# Patient Record
Sex: MALE | Race: BLACK OR AFRICAN AMERICAN | NOT HISPANIC OR LATINO | Employment: FULL TIME | URBAN - METROPOLITAN AREA
[De-identification: names, ages, dates, MRNs, and addresses within clinical notes are randomized per-mention and may not be internally consistent; named-entity substitution may affect disease eponyms.]

---

## 2018-01-17 NOTE — MISCELLANEOUS
Message  Return to work or school:   Kendall Vega is under my professional care  He was seen in my office on 4/12/2016   He is able to return to work on  4/18/2016       Dr April Arrington        Signatures   Electronically signed by : STEPHANIE Heller ; Apr 18 2016 11:10AM EST

## 2018-11-28 ENCOUNTER — OFFICE VISIT (OUTPATIENT)
Dept: INTERNAL MEDICINE CLINIC | Facility: CLINIC | Age: 38
End: 2018-11-28
Payer: COMMERCIAL

## 2018-11-28 VITALS
TEMPERATURE: 97.8 F | BODY MASS INDEX: 35.14 KG/M2 | HEIGHT: 75 IN | HEART RATE: 76 BPM | SYSTOLIC BLOOD PRESSURE: 144 MMHG | WEIGHT: 282.63 LBS | DIASTOLIC BLOOD PRESSURE: 100 MMHG

## 2018-11-28 DIAGNOSIS — G47.33 OBSTRUCTIVE SLEEP APNEA: ICD-10-CM

## 2018-11-28 DIAGNOSIS — Z86.39 HISTORY OF HYPERLIPIDEMIA: ICD-10-CM

## 2018-11-28 DIAGNOSIS — Z00.00 ROUTINE HEALTH MAINTENANCE: ICD-10-CM

## 2018-11-28 DIAGNOSIS — I10 ESSENTIAL HYPERTENSION: Primary | ICD-10-CM

## 2018-11-28 DIAGNOSIS — R42 LIGHTHEADEDNESS: ICD-10-CM

## 2018-11-28 PROBLEM — I15.9 SECONDARY HYPERTENSION: Status: ACTIVE | Noted: 2018-11-28

## 2018-11-28 PROCEDURE — 1036F TOBACCO NON-USER: CPT | Performed by: INTERNAL MEDICINE

## 2018-11-28 PROCEDURE — 3008F BODY MASS INDEX DOCD: CPT | Performed by: INTERNAL MEDICINE

## 2018-11-28 PROCEDURE — 99204 OFFICE O/P NEW MOD 45 MIN: CPT | Performed by: INTERNAL MEDICINE

## 2018-11-28 RX ORDER — AMLODIPINE BESYLATE 5 MG/1
1 TABLET ORAL DAILY
COMMUNITY
Start: 2016-06-13 | End: 2018-11-28 | Stop reason: CLARIF

## 2018-11-28 RX ORDER — OXYCODONE HYDROCHLORIDE AND ACETAMINOPHEN 5; 325 MG/1; MG/1
TABLET ORAL
COMMUNITY
Start: 2015-12-18 | End: 2018-11-28 | Stop reason: CLARIF

## 2018-11-28 NOTE — PROGRESS NOTES
ASSESSMENT/PLAN:  Problem List Items Addressed This Visit     Essential hypertension - Primary     · On recheck of patient's blood pressure it was noted to be 134/78  · Will hold off on medications at this time  · Stressed the importance of wearing CPAP machine at night  · Discussed with patient regarding herbal medications and he agreed to stop taking them  · Patient to check blood pressures as outpatient and to call if elevated pressures noted         History of hyperlipidemia     · Will recheck lipid panel         Obstructive sleep apnea     · Continue to wear CPAP machine every night  · Patient states he has been feeling better since wearing his CPAP machine the past couple days and is no longer having headaches in the morning  Lightheadedness     · Discuss keeping up with fluid hydration throughout the day and slowly changing from sitting to standing position         Routine health maintenance     · Patient states he previously had HIV testing done which came back negative         Relevant Orders    Lipid panel    CBC and differential    Comprehensive metabolic panel    HEMOGLOBIN A1C W/ EAG ESTIMATION          Health Maintenance:  Patient denied immunizations      CHIEF COMPLAINT:   Lightheadedness    HISTORY OF PRESENT ILLNESS:  23-year-old male presents to the outpatient clinic dizziness  Past medical history sick in for hypertension, hyperlipidemia, and obstructive sleep apnea  Patient states that he was prescribed blood pressure medications in the past but had not taken because he "did not like medications"  He states he has been taking herbal medications including leave of life, Lisa Sagar vera, and garlic" about once a month  Recently he has been complaining of lightheadedness that comes on when he is working specifically in enclosed areas  He has never had any associated syncope, chest pain, or shortness of breath  Patient was also complaining of headaches that occur in the morning  Patient states that since he has been using his CPAP machine, which he had not used prior, his headaches have improved  Patient has been using CPAP machine for last couple days  Patient denies any tobacco, alcohol, or drug use  Patient is currently working as an  for [employer]   Constitutional: Negative for chills, fatigue, fever and unexpected weight change  HENT: Negative for congestion  Eyes: Negative for visual disturbance  Respiratory: Negative for cough, chest tightness, shortness of breath and wheezing  Cardiovascular: Negative for chest pain, palpitations and leg swelling  Gastrointestinal: Negative for abdominal distention, abdominal pain, constipation, diarrhea, nausea and vomiting  Genitourinary: Negative for dysuria  Neurological: Positive for headaches  Negative for dizziness, syncope, weakness and numbness  OBJECTIVE:  Vitals:    11/28/18 1347   BP: 144/100   Pulse: 76   Temp: 97 8 °F (36 6 °C)   Weight: 128 kg (282 lb 10 1 oz)   Height: 6' 3" (1 905 m)     Physical Exam   Constitutional: He is oriented to person, place, and time  He appears well-developed and well-nourished  No distress  HENT:   Head: Normocephalic and atraumatic  Eyes: Pupils are equal, round, and reactive to light  Conjunctivae are normal  No scleral icterus  Neck: Normal range of motion  Neck supple  Cardiovascular: Normal rate, regular rhythm, normal heart sounds and intact distal pulses  Exam reveals no gallop and no friction rub  No murmur heard  Pulmonary/Chest: Effort normal and breath sounds normal  No respiratory distress  He has no wheezes  He has no rales  Abdominal: Soft  Bowel sounds are normal  He exhibits no distension  There is no tenderness  Musculoskeletal: Normal range of motion  He exhibits no edema  5/5 strength in all extremities   Neurological: He is alert and oriented to person, place, and time  No cranial nerve deficit     Skin: Skin is warm  No rash noted  Nursing note and vitals reviewed  No current outpatient prescriptions on file  History reviewed  No pertinent past medical history  History reviewed  No pertinent surgical history  Social History     Social History    Marital status: /Civil Union     Spouse name: N/A    Number of children: N/A    Years of education: N/A     Occupational History    Not on file  Social History Main Topics    Smoking status: Former Smoker    Smokeless tobacco: Never Used    Alcohol use Yes      Comment: Social    Drug use: No    Sexual activity: Not on file     Other Topics Concern    Not on file     Social History Narrative    Caffeine use    Always uses seat belt          Family History   Problem Relation Age of Onset    Diabetes Mother     Hypertension Mother     Stroke Mother     Hyperlipidemia Sister        Cesar Haja Prit[facility] Internal [facility] PGY-1  [address] [facility]  [address] [facility]  [address] [facility],[address]  [place], [address] [facility]

## 2018-11-28 NOTE — ASSESSMENT & PLAN NOTE
· Continue to wear CPAP machine every night  · Patient states he has been feeling better since wearing his CPAP machine the past couple days and is no longer having headaches in the morning 
· Discuss keeping up with fluid hydration throughout the day and slowly changing from sitting to standing position
· On recheck of patient's blood pressure it was noted to be 134/78  · Will hold off on medications at this time  · Stressed the importance of wearing CPAP machine at night  · Discussed with patient regarding herbal medications and he agreed to stop taking them  · Patient to check blood pressures as outpatient and to call if elevated pressures noted
· Patient states he previously had HIV testing done which came back negative
· Will recheck lipid panel
Breast tumor  right  History of biopsy  right axilla  History of cholecystectomy    History of hip replacement, total, left    History of hysterectomy  partial  Surgery, elective  hemorrhoids

## 2019-01-27 ENCOUNTER — APPOINTMENT (EMERGENCY)
Dept: RADIOLOGY | Facility: HOSPITAL | Age: 39
End: 2019-01-27
Payer: COMMERCIAL

## 2019-01-27 ENCOUNTER — HOSPITAL ENCOUNTER (EMERGENCY)
Facility: HOSPITAL | Age: 39
Discharge: HOME | End: 2019-01-28
Attending: EMERGENCY MEDICINE | Admitting: EMERGENCY MEDICINE
Payer: COMMERCIAL

## 2019-01-27 DIAGNOSIS — J18.9 PNEUMONIA: Primary | ICD-10-CM

## 2019-01-27 LAB
ALBUMIN SERPL BCP-MCNC: 3.9 G/DL (ref 3.5–5)
ALP SERPL-CCNC: 74 U/L (ref 46–116)
ALT SERPL W P-5'-P-CCNC: 33 U/L (ref 12–78)
ANION GAP SERPL CALCULATED.3IONS-SCNC: 7 MMOL/L (ref 4–13)
AST SERPL W P-5'-P-CCNC: 19 U/L (ref 5–45)
BACTERIA UR QL AUTO: ABNORMAL /HPF
BASOPHILS # BLD AUTO: 0.01 THOUSANDS/ΜL (ref 0–0.1)
BASOPHILS NFR BLD AUTO: 0 % (ref 0–1)
BILIRUB SERPL-MCNC: 0.41 MG/DL (ref 0.2–1)
BILIRUB UR QL STRIP: ABNORMAL
BUN SERPL-MCNC: 18 MG/DL (ref 5–25)
CALCIUM SERPL-MCNC: 8.4 MG/DL (ref 8.3–10.1)
CHLORIDE SERPL-SCNC: 102 MMOL/L (ref 100–108)
CLARITY UR: CLEAR
CLARITY, POC: CLEAR
CO2 SERPL-SCNC: 27 MMOL/L (ref 21–32)
COLOR UR: YELLOW
COLOR, POC: YELLOW
CREAT SERPL-MCNC: 1.56 MG/DL (ref 0.6–1.3)
EOSINOPHIL # BLD AUTO: 0.01 THOUSAND/ΜL (ref 0–0.61)
EOSINOPHIL NFR BLD AUTO: 0 % (ref 0–6)
ERYTHROCYTE [DISTWIDTH] IN BLOOD BY AUTOMATED COUNT: 14.3 % (ref 11.6–15.1)
GFR SERPL CREATININE-BSD FRML MDRD: 63 ML/MIN/1.73SQ M
GLUCOSE SERPL-MCNC: 114 MG/DL (ref 65–140)
GLUCOSE UR STRIP-MCNC: NEGATIVE MG/DL
HCT VFR BLD AUTO: 42 % (ref 36.5–49.3)
HGB BLD-MCNC: 14.4 G/DL (ref 12–17)
HGB UR QL STRIP.AUTO: ABNORMAL
HYALINE CASTS #/AREA URNS LPF: ABNORMAL /LPF
IMM GRANULOCYTES # BLD AUTO: 0.01 THOUSAND/UL (ref 0–0.2)
IMM GRANULOCYTES NFR BLD AUTO: 0 % (ref 0–2)
KETONES UR STRIP-MCNC: ABNORMAL MG/DL
LEUKOCYTE ESTERASE UR QL STRIP: NEGATIVE
LYMPHOCYTES # BLD AUTO: 0.83 THOUSANDS/ΜL (ref 0.6–4.47)
LYMPHOCYTES NFR BLD AUTO: 18 % (ref 14–44)
MCH RBC QN AUTO: 25.8 PG (ref 26.8–34.3)
MCHC RBC AUTO-ENTMCNC: 34.3 G/DL (ref 31.4–37.4)
MCV RBC AUTO: 75 FL (ref 82–98)
MONOCYTES # BLD AUTO: 0.39 THOUSAND/ΜL (ref 0.17–1.22)
MONOCYTES NFR BLD AUTO: 9 % (ref 4–12)
NEUTROPHILS # BLD AUTO: 3.3 THOUSANDS/ΜL (ref 1.85–7.62)
NEUTS SEG NFR BLD AUTO: 73 % (ref 43–75)
NITRITE UR QL STRIP: NEGATIVE
NON-SQ EPI CELLS URNS QL MICRO: ABNORMAL /HPF
NRBC BLD AUTO-RTO: 0 /100 WBCS
PH UR STRIP.AUTO: 5.5 [PH] (ref 4.5–8)
PLATELET # BLD AUTO: 229 THOUSANDS/UL (ref 149–390)
PMV BLD AUTO: 11.6 FL (ref 8.9–12.7)
POTASSIUM SERPL-SCNC: 3.4 MMOL/L (ref 3.5–5.3)
PROT SERPL-MCNC: 8.3 G/DL (ref 6.4–8.2)
PROT UR STRIP-MCNC: ABNORMAL MG/DL
RBC # BLD AUTO: 5.58 MILLION/UL (ref 3.88–5.62)
RBC #/AREA URNS AUTO: ABNORMAL /HPF
SODIUM SERPL-SCNC: 136 MMOL/L (ref 136–145)
SP GR UR STRIP.AUTO: 1.02 (ref 1–1.03)
TROPONIN I SERPL-MCNC: <0.02 NG/ML
UROBILINOGEN UR QL STRIP.AUTO: 1 E.U./DL
WBC # BLD AUTO: 4.55 THOUSAND/UL (ref 4.31–10.16)
WBC #/AREA URNS AUTO: ABNORMAL /HPF

## 2019-01-27 PROCEDURE — 80053 COMPREHEN METABOLIC PANEL: CPT | Performed by: EMERGENCY MEDICINE

## 2019-01-27 PROCEDURE — 96361 HYDRATE IV INFUSION ADD-ON: CPT

## 2019-01-27 PROCEDURE — 36415 COLL VENOUS BLD VENIPUNCTURE: CPT | Performed by: EMERGENCY MEDICINE

## 2019-01-27 PROCEDURE — 96374 THER/PROPH/DIAG INJ IV PUSH: CPT

## 2019-01-27 PROCEDURE — 93005 ELECTROCARDIOGRAM TRACING: CPT

## 2019-01-27 PROCEDURE — 71046 X-RAY EXAM CHEST 2 VIEWS: CPT

## 2019-01-27 PROCEDURE — 85025 COMPLETE CBC W/AUTO DIFF WBC: CPT | Performed by: EMERGENCY MEDICINE

## 2019-01-27 PROCEDURE — 94640 AIRWAY INHALATION TREATMENT: CPT

## 2019-01-27 PROCEDURE — 84484 ASSAY OF TROPONIN QUANT: CPT | Performed by: EMERGENCY MEDICINE

## 2019-01-27 PROCEDURE — 99284 EMERGENCY DEPT VISIT MOD MDM: CPT

## 2019-01-27 PROCEDURE — 81001 URINALYSIS AUTO W/SCOPE: CPT

## 2019-01-27 RX ORDER — ACETAMINOPHEN 325 MG/1
975 TABLET ORAL ONCE
Status: COMPLETED | OUTPATIENT
Start: 2019-01-27 | End: 2019-01-27

## 2019-01-27 RX ORDER — KETOROLAC TROMETHAMINE 30 MG/ML
15 INJECTION, SOLUTION INTRAMUSCULAR; INTRAVENOUS ONCE
Status: COMPLETED | OUTPATIENT
Start: 2019-01-27 | End: 2019-01-27

## 2019-01-27 RX ADMIN — KETOROLAC TROMETHAMINE 15 MG: 30 INJECTION, SOLUTION INTRAMUSCULAR at 23:16

## 2019-01-27 RX ADMIN — ACETAMINOPHEN 975 MG: 325 TABLET, FILM COATED ORAL at 23:16

## 2019-01-27 RX ADMIN — ALBUTEROL SULFATE 5 MG: 2.5 SOLUTION RESPIRATORY (INHALATION) at 23:16

## 2019-01-27 RX ADMIN — SODIUM CHLORIDE 2000 ML: 0.9 INJECTION, SOLUTION INTRAVENOUS at 23:11

## 2019-01-27 RX ADMIN — IPRATROPIUM BROMIDE 0.5 MG: 0.5 SOLUTION RESPIRATORY (INHALATION) at 23:16

## 2019-01-28 VITALS
OXYGEN SATURATION: 97 % | HEART RATE: 101 BPM | SYSTOLIC BLOOD PRESSURE: 128 MMHG | DIASTOLIC BLOOD PRESSURE: 60 MMHG | TEMPERATURE: 100.5 F | RESPIRATION RATE: 18 BRPM | BODY MASS INDEX: 33.85 KG/M2 | WEIGHT: 278 LBS | HEIGHT: 76 IN

## 2019-01-28 LAB
ATRIAL RATE: 113 BPM
P AXIS: 66 DEGREES
PR INTERVAL: 132 MS
QRS AXIS: 92 DEGREES
QRSD INTERVAL: 84 MS
QT INTERVAL: 296 MS
QTC INTERVAL: 406 MS
T WAVE AXIS: 36 DEGREES
VENTRICULAR RATE: 113 BPM

## 2019-01-28 PROCEDURE — 96361 HYDRATE IV INFUSION ADD-ON: CPT

## 2019-01-28 PROCEDURE — 93010 ELECTROCARDIOGRAM REPORT: CPT | Performed by: INTERNAL MEDICINE

## 2019-01-28 RX ORDER — DOXYCYCLINE HYCLATE 100 MG/1
200 CAPSULE ORAL 2 TIMES DAILY
Qty: 16 CAPSULE | Refills: 0 | Status: SHIPPED | OUTPATIENT
Start: 2019-01-28 | End: 2019-02-02

## 2019-01-28 RX ORDER — DOXYCYCLINE HYCLATE 100 MG/1
200 CAPSULE ORAL ONCE
Status: COMPLETED | OUTPATIENT
Start: 2019-01-28 | End: 2019-01-28

## 2019-01-28 RX ORDER — PROMETHAZINE HYDROCHLORIDE AND CODEINE PHOSPHATE 6.25; 1 MG/5ML; MG/5ML
5 SYRUP ORAL EVERY 6 HOURS PRN
Qty: 118 ML | Refills: 0 | Status: SHIPPED | OUTPATIENT
Start: 2019-01-28 | End: 2019-01-31

## 2019-01-28 RX ADMIN — DOXYCYCLINE 200 MG: 100 CAPSULE ORAL at 00:54

## 2019-01-28 RX ADMIN — DOXYCYCLINE 200 MG: 100 CAPSULE ORAL at 01:09

## 2019-01-28 NOTE — ED PROVIDER NOTES
History  Chief Complaint   Patient presents with    Generalized Body Aches     Pt reports feeling generally weak since saturday with loss in appetite and cough/congestion     HPI       24-year-old with unremarkable past medical history presenting with chief complaint of feeling weak  It started on Saturday  Patient admits to hacking cough, patient admits the  Constant rhinorrhea  Patient admits to dripping in the back of the throat  Worse at night  Patient states that he has body aches and fatigue  Did not receive flu vaccine  Patient is here in the emergency department because he feels so fatigue  Patient denies fever chills rigors  Patient admits to diffuse myalgias no sick contacts at home  Patient has been able to tolerate p o  Without difficulty but patient states that he has not wanted to eat as much  Cough has become more yellow in nature  Patient denies fever chills rigors chest pain palpitations hemoptysis pleurisy abdominal pain nausea vomiting diarrhea constipation urinary symptoms motor weakness numbness and tingling  None       No past medical history on file  No past surgical history on file  Family History   Problem Relation Age of Onset    Diabetes Mother     Hypertension Mother     Stroke Mother     Hyperlipidemia Sister      I have reviewed and agree with the history as documented  Social History   Substance Use Topics    Smoking status: Former Smoker    Smokeless tobacco: Never Used    Alcohol use Yes      Comment: Social        Review of Systems   Constitutional: Positive for fatigue  Respiratory: Positive for cough  All other systems reviewed and are negative        Physical Exam  ED Triage Vitals [01/27/19 2108]   Temperature Pulse Respirations Blood Pressure SpO2   100 5 °F (38 1 °C) (!) 123 18 142/78 96 %      Temp Source Heart Rate Source Patient Position - Orthostatic VS BP Location FiO2 (%)   Oral Monitor Lying Left arm --      Pain Score       7 Orthostatic Vital Signs  Vitals:    01/27/19 2108 01/27/19 2239 01/27/19 2300 01/28/19 0103   BP: 142/78 164/83 149/86 128/60   Pulse: (!) 123 (!) 121 (!) 110 101   Patient Position - Orthostatic VS: Lying Lying  Lying       Physical Exam   Constitutional: He is oriented to person, place, and time  He appears well-developed and well-nourished  No distress  HENT:   Head: Normocephalic and atraumatic  Right Ear: External ear normal    Left Ear: External ear normal    Nose: Mucosal edema and rhinorrhea present  Mouth/Throat: Oropharynx is clear and moist  No oropharyngeal exudate  Eyes: Pupils are equal, round, and reactive to light  Conjunctivae and EOM are normal  Right eye exhibits no discharge  Left eye exhibits no discharge  No scleral icterus  Neck: Normal range of motion  Neck supple  No JVD present  No tracheal deviation present  No thyromegaly present  Cardiovascular: Normal rate, regular rhythm, normal heart sounds and intact distal pulses  No murmur heard  Pulmonary/Chest: Effort normal and breath sounds normal  No stridor  No respiratory distress  He has no wheezes  He has no rales  Abdominal: Soft  Bowel sounds are normal  He exhibits no distension and no mass  There is no tenderness  There is no rebound and no guarding  Musculoskeletal: Normal range of motion  He exhibits no edema, tenderness or deformity  Lymphadenopathy:     He has no cervical adenopathy  Neurological: He is alert and oriented to person, place, and time  No cranial nerve deficit  He exhibits normal muscle tone  Coordination normal    Skin: Skin is warm and dry  No rash noted  He is not diaphoretic  No erythema  Psychiatric: He has a normal mood and affect  His behavior is normal  Judgment and thought content normal    Nursing note and vitals reviewed        ED Medications  Medications   sodium chloride 0 9 % bolus 2,000 mL (0 mL Intravenous Stopped 1/28/19 0253)   acetaminophen (TYLENOL) tablet 975 mg (975 mg Oral Given 1/27/19 2316)   ketorolac (TORADOL) injection 15 mg (15 mg Intravenous Given 1/27/19 2316)   albuterol inhalation solution 5 mg (5 mg Nebulization Given 1/27/19 2316)   ipratropium (ATROVENT) 0 02 % inhalation solution 0 5 mg (0 5 mg Nebulization Given 1/27/19 2316)   doxycycline hyclate (VIBRAMYCIN) capsule 200 mg (200 mg Oral Given 1/28/19 0054)   doxycycline hyclate (VIBRAMYCIN) capsule 200 mg (200 mg Oral Given 1/28/19 0109)       Diagnostic Studies  Results Reviewed     Procedure Component Value Units Date/Time    Troponin I [[id number]]  (Normal) Collected:  01/27/19 2309    Lab Status:  Final result Specimen:  Blood from Arm, Left Updated:  01/27/19 2337     Troponin I <0 02 ng/mL     Comprehensive metabolic panel [[id number]]  (Abnormal) Collected:  01/27/19 2310    Lab Status:  Final result Specimen:  Blood from Arm, Left Updated:  01/27/19 2335     Sodium 136 mmol/L      Potassium 3 4 (L) mmol/L      Chloride 102 mmol/L      CO2 27 mmol/L      ANION GAP 7 mmol/L      BUN 18 mg/dL      Creatinine 1 56 (H) mg/dL      Glucose 114 mg/dL      Calcium 8 4 mg/dL      AST 19 U/L      ALT 33 U/L      Alkaline Phosphatase 74 U/L      Total Protein 8 3 (H) g/dL      Albumin 3 9 g/dL      Total Bilirubin 0 41 mg/dL      eGFR 63 ml/min/1 73sq m     Narrative:         National Kidney Disease Education Program recommendations are as follows:  GFR calculation is accurate only with a steady state creatinine  Chronic Kidney disease less than 60 ml/min/1 73 sq  meters  Kidney failure less than 15 ml/min/1 73 sq  meters      CBC and differential [[id number]]  (Abnormal) Collected:  01/27/19 2309    Lab Status:  Final result Specimen:  Blood from Arm, Left Updated:  01/27/19 2320     WBC 4 55 Thousand/uL      RBC 5 58 Million/uL      Hemoglobin 14 4 g/dL      Hematocrit 42 0 %      MCV 75 (L) fL      MCH 25 8 (L) pg      MCHC 34 3 g/dL      RDW 14 3 %      MPV 11 6 fL      Platelets 807 Thousands/uL      nRBC 0 /100 WBCs      Neutrophils Relative 73 %      Immat GRANS % 0 %      Lymphocytes Relative 18 %      Monocytes Relative 9 %      Eosinophils Relative 0 %      Basophils Relative 0 %      Neutrophils Absolute 3 30 Thousands/µL      Immature Grans Absolute 0 01 Thousand/uL      Lymphocytes Absolute 0 83 Thousands/µL      Monocytes Absolute 0 39 Thousand/µL      Eosinophils Absolute 0 01 Thousand/µL      Basophils Absolute 0 01 Thousands/µL     Urine Microscopic [[id number]]  (Abnormal) Collected:  01/27/19 2207    Lab Status:  Final result Specimen:  Urine from Urine, Other Updated:  01/27/19 2218     RBC, UA None Seen /hpf      WBC, UA 2-4 (A) /hpf      Epithelial Cells None Seen /hpf      Bacteria, UA None Seen /hpf      Hyaline Casts, UA None Seen /lpf     POCT urinalysis dipstick [[id number]]  (Normal) Resulted:  01/27/19 2206    Lab Status:  Final result Updated:  01/27/19 2206     Color, UA yellow     Clarity, UA clear    ED Urine Macroscopic [[id number]]  (Abnormal) Collected:  01/27/19 2207    Lab Status:  Final result Specimen:  Urine Updated:  01/27/19 2205     Color, UA Yellow     Clarity, UA Clear     pH, UA 5 5     Leukocytes, UA Negative     Nitrite, UA Negative     Protein, UA 30 (1+) (A) mg/dl      Glucose, UA Negative mg/dl      Ketones, UA Trace (A) mg/dl      Urobilinogen, UA 1 0 E U /dl      Bilirubin, UA Interference- unable to analyze (A)     Blood, UA Trace (A)     Specific [place], UA 1 025    Narrative:       CLINITEK RESULT                 XR chest 2 views   ED Interpretation by Stevenson Gilford, DO (01/27 1419)   Lingular infiltrate      Final Result by Rogers Gates MD (01/28 1010)      Increased mild bibasilar opacity compatible with atelectasis  No definite focal infiltrate              Workstation performed: [id number]               Procedures  ECG 12 Lead Documentation  Date/Time: 1/27/2019 10:49 PM  Performed by: Anila Longo  Authorized by: Anila Longo     Comments: Ventricular Rate    Atrial Rate    ME Interval ms 132   QRSD Interval ms 84   QT Interval ms 296   QTC Interval ms 406   P Axis degrees 66   QRS Axis degrees 92   T Wave Axis degrees 36   Narrative     Sinus tachycardia  Rightward axis  Borderline ECG  No previous ECGs available                Phone Consults  ED Phone Contact    ED Course                               MDM  Number of Diagnoses or Management Options  Pneumonia:   Diagnosis management comments: 20-year-old male presenting with cough  On exam vital signs show tachycardia  Patient appears to have infectious process with rhinorrhea  Differential diagnosis includes but not limited to pneumoni, bronchitis, pneumothorax, influenza  Lab work showed mild elevation in creatinine, patient given 2 L, heart rate trended down  Chest x-ray shows lingular infiltrate  Patient given doxycycline  Patient will follow up with PCP in the next 2 days  ED return precautions discussed  Patient agrees to follow-up care he demonstrates understanding  EKG showed tachycardia with no ischemia, troponin was negative doubt ACS at this time considered other diagnosis is such as pulmonary embolism, this appears to be infectious in nature  Pulmonary embolism clinically excluded  CritCare Time    Disposition  Final diagnoses:   Pneumonia     Time reflects when diagnosis was documented in both MDM as applicable and the Disposition within this note     Time User Action Codes Description Comment    1/28/2019  1:58 AM Aquilino Riddle Add [J18 [id number]] Pneumonia       ED Disposition     ED Disposition Condition Comment    Discharge  Liasha Pilblanca discharge to home/self care  Condition at discharge: Good    Return precautions were discussed with patient  Patient understands when to return to  Emergency department  Patient agrees to discharge plan and follow up care             Follow-up Information     Follow up With Specialties Details Why Contact Marylou Collins MD Family Medicine Go in 2 days  [address] [facility]  [phone]            Discharge Medication List as of 1/28/2019  2:07 AM      START taking these medications    Details   doxycycline hyclate (VIBRAMYCIN) 100 mg capsule Take 2 capsules (200 mg total) by mouth 2 (two) times a day for 5 days, Starting Mon 1/28/2019, Until Sat 2/2/2019, Print      promethazine-codeine (PHENERGAN WITH CODEINE) 6 25-10 mg/5 mL syrup Take 5 mL by mouth every 6 (six) hours as needed for cough for up to 3 days, Starting Mon 1/28/2019, Until Thu 1/31/2019, Print           No discharge procedures on file  ED Provider  Attending physically available and evaluated Urbano Valdovinos I managed the patient along with the ED Attending      Electronically Signed by         Robbin Arellano DO  01/28/19 4511

## 2019-01-28 NOTE — DISCHARGE INSTRUCTIONS
Community Acquired Pneumonia   WHAT YOU NEED TO KNOW:   Community-acquired pneumonia (CAP) is a lung infection that you get outside of a hospital or nursing home setting  Your lungs become inflamed and cannot work well  CAP may be caused by bacteria, viruses, or fungi  DISCHARGE INSTRUCTIONS:   Return to the emergency department if:   · You are confused and cannot think clearly  · You have increased trouble breathing  · Your lips or fingernails turn gray or blue  Contact your healthcare provider if:   · Your symptoms do not get better, or they get worse  · You are urinating less, or not at all  · You have questions or concerns about your condition or care  Medicines:   · Medicines  may be given to treat a bacterial, viral, or fungal infection  You may also be given medicines to dilate your bronchial tubes to help you breathe more easily  · Take your medicine as directed  Contact your healthcare provider if you think your medicine is not helping or if you have side effects  Tell him or her if you are allergic to any medicine  Keep a list of the medicines, vitamins, and herbs you take  Include the amounts, and when and why you take them  Bring the list or the pill bottles to follow-up visits  Carry your medicine list with you in case of an emergency  Follow up with your healthcare provider within 3 days or as directed: You may need another x-ray  Write down your questions so you remember to ask them during your visits  Deep breathing and coughing:  Deep breathing helps open the air passages in your lungs  Coughing helps bring up mucus from your lungs  Take a deep breath and hold the breath as long as you can  Then push the air out of your lungs with a deep, strong cough  Spit out any mucus you have coughed up  Take 10 deep breaths in a row every hour that you are awake  Remember to follow each deep breath with a cough     Do not smoke or allow others to smoke around you:  Nicotine and other chemicals in cigarettes and cigars can cause lung damage  Ask your healthcare provider for information if you currently smoke and need help to quit  E-cigarettes or smokeless tobacco still contain nicotine  Talk to your healthcare provider before you use these products  Manage CAP at home:   · Breathe warm, moist air  This helps loosen mucus  Loosely place a warm, wet washcloth over your nose and mouth  A room humidifier may also help make the air moist     · Drink liquids as directed  Ask your healthcare provider how much liquid to drink each day and which liquids to drink  Liquids help make mucus thin and easier to get out of your body  · Gently tap your chest   This helps loosen mucus so it is easier to cough  Lie with your head lower than your chest several times a day and tap your chest      · Get plenty of rest   Rest helps your body heal   Prevent CAP:   · Wash your hands often with soap and water  Carry germ-killing hand gel with you  You can use the gel to clean your hands when soap and water are not available  Do not touch your eyes, nose, or mouth unless you have washed your hands first      · Clean surfaces often  Clean doorknobs, countertops, cell phones, and other surfaces that are touched often  · Always cover your mouth when you cough  Cough into a tissue or your shirtsleeve so you do not spread germs from your hands  · Try to avoid people who have a cold or the flu  If you are sick, stay away from others as much as possible  · Ask about vaccines  You may need a vaccine to help prevent pneumonia  Get an influenza (flu) vaccine every year as soon as it becomes available  © 2017 [employer] [employer] Information is for End User's use only and may not be sold, redistributed or otherwise used for commercial purposes  All illustrations and images included in CareNotes® are the copyrighted property of [employer]  or [employer]    The above information is an  only  It is not intended as medical advice for individual conditions or treatments  Talk to your doctor, nurse or pharmacist before following any medical regimen to see if it is safe and effective for you

## 2019-10-10 ENCOUNTER — OFFICE VISIT (OUTPATIENT)
Dept: INTERNAL MEDICINE CLINIC | Facility: CLINIC | Age: 39
End: 2019-10-10

## 2019-10-10 VITALS
HEIGHT: 75 IN | WEIGHT: 286.16 LBS | DIASTOLIC BLOOD PRESSURE: 82 MMHG | BODY MASS INDEX: 35.58 KG/M2 | SYSTOLIC BLOOD PRESSURE: 120 MMHG | HEART RATE: 80 BPM | TEMPERATURE: 98.1 F

## 2019-10-10 DIAGNOSIS — Z86.39 HISTORY OF HYPERLIPIDEMIA: ICD-10-CM

## 2019-10-10 DIAGNOSIS — J30.9 ALLERGIC SINUSITIS: Primary | ICD-10-CM

## 2019-10-10 DIAGNOSIS — G47.33 OBSTRUCTIVE SLEEP APNEA: ICD-10-CM

## 2019-10-10 PROCEDURE — 99213 OFFICE O/P EST LOW 20 MIN: CPT | Performed by: INTERNAL MEDICINE

## 2019-10-10 PROCEDURE — 1036F TOBACCO NON-USER: CPT | Performed by: INTERNAL MEDICINE

## 2019-10-10 PROCEDURE — 3008F BODY MASS INDEX DOCD: CPT | Performed by: INTERNAL MEDICINE

## 2019-10-10 RX ORDER — AMILORIDE HCL 5 MG
10 TABLET ORAL EVERY 6 HOURS
Qty: 16 TABLET | Refills: 0 | Status: SHIPPED | OUTPATIENT
Start: 2019-10-10 | End: 2022-03-30

## 2019-10-10 RX ORDER — FLUTICASONE PROPIONATE 50 MCG
1 SPRAY, SUSPENSION (ML) NASAL DAILY
Qty: 1 BOTTLE | Refills: 0 | Status: SHIPPED | OUTPATIENT
Start: 2019-10-10 | End: 2022-03-30

## 2019-10-10 NOTE — PROGRESS NOTES
Assessment/Plan:    Allergic sinusitis  -     start fluticasone (FLONASE) 50 mcg/act nasal spray; 1 spray into each nostril daily  -     start phenylephrine (SUDAFED PE) 10 MG TABS; Take 1 tablet (10 mg total) by mouth every 6 (six) hours for 4 days  -     will check HIV    Obstructive sleep apnea        -     instructed on importance of using CPAP given headaches and hypertension with SAHIL    Patient offered influenza vaccine in clinic however refused  Subjective:      Patient ID: Kelly Clarke is a 37 y o  male  49-year-old male with past medical history significant for SAHIL noncompliant with CPAP, hypertension, hyperlipidemia, former smoker  Patient comes to clinic today for same-day appointment for frontal headache that he has had since August   Patient states headache is intermittent and only present when bending over  Patient states pain lasts few seconds  Patient also states he has occasional runny nose and sneezing  Patient denies any fevers, chills, night sweats, nausea, vomiting, photophobia, phonophobia, photopsia  Patient states he has obstructive sleep apnea but has not been using CPAP SCD does not find a comfortable  Patient instructed on the importance of using CPAP given headaches, hypertension  Patient's blood pressure 120/82 today in office  Patient states he is sexually active with 1 partner who is a female and does use protection  Patient states he was tested for HIV months ago however this is not seen in chart  Patient denies focal neurological deficit  The following portions of the patient's history were reviewed and updated as appropriate: allergies, current medications, past family history, past medical history, past social history, past surgical history and problem list     Review of Systems   Constitutional: Negative for appetite change, chills, diaphoresis, fatigue, fever and unexpected weight change  HENT: Negative for sore throat      Eyes: Negative for visual disturbance  Respiratory: Negative for cough, chest tightness, shortness of breath and wheezing  Cardiovascular: Negative for chest pain, palpitations and leg swelling  Gastrointestinal: Negative for abdominal distention, abdominal pain, blood in stool, constipation, diarrhea, nausea and vomiting  Genitourinary: Negative for difficulty urinating, flank pain and urgency  Musculoskeletal: Negative for arthralgias and myalgias  Skin: Negative for pallor and rash  Neurological: Positive for headaches  Negative for dizziness, weakness and light-headedness  Objective:      /82 (BP Location: Other (Comment), Patient Position: Sitting, Cuff Size: Standard) Comment (BP Location): Left forearm  Pulse 80   Temp 98 1 °F (36 7 °C) (Oral)   Ht 6' 3" (1 905 m)   Wt 130 kg (286 lb 2 5 oz)   BMI 35 77 kg/m²          Physical Exam   Constitutional: He is oriented to person, place, and time  He appears well-developed and well-nourished  No distress  HENT:   Head: Normocephalic and atraumatic  Right Ear: External ear normal    Left Ear: External ear normal    Nose: Nose normal    Mouth/Throat: Oropharynx is clear and moist  No oropharyngeal exudate  No tenderness to palpation of the sinuses   Eyes: Pupils are equal, round, and reactive to light  Conjunctivae and EOM are normal  No scleral icterus  Neck: Normal range of motion  Neck supple  Cardiovascular: Normal rate and regular rhythm  No murmur heard  Pulmonary/Chest: Effort normal and breath sounds normal  He has no wheezes  Abdominal: Soft  Bowel sounds are normal    Musculoskeletal: Normal range of motion  He exhibits no edema or deformity  Neurological: He is alert and oriented to person, place, and time  No cranial nerve deficit  Skin: Skin is warm and dry  No rash noted  He is not diaphoretic  No erythema  Psychiatric: He has a normal mood and affect  His behavior is normal    Nursing note and vitals reviewed

## 2020-06-10 ENCOUNTER — TELEPHONE (OUTPATIENT)
Dept: INTERNAL MEDICINE CLINIC | Facility: CLINIC | Age: 40
End: 2020-06-10

## 2021-02-19 ENCOUNTER — TELEPHONE (OUTPATIENT)
Dept: PSYCHIATRY | Facility: CLINIC | Age: 41
End: 2021-02-19

## 2021-03-05 ENCOUNTER — TELEPHONE (OUTPATIENT)
Dept: PSYCHIATRY | Facility: CLINIC | Age: 41
End: 2021-03-05

## 2021-04-06 ENCOUNTER — OFFICE VISIT (OUTPATIENT)
Dept: URGENT CARE | Age: 41
End: 2021-04-06
Payer: COMMERCIAL

## 2021-04-06 VITALS — RESPIRATION RATE: 18 BRPM | OXYGEN SATURATION: 100 % | TEMPERATURE: 97.6 F | HEART RATE: 82 BPM

## 2021-04-06 DIAGNOSIS — Z20.822 CONTACT WITH AND (SUSPECTED) EXPOSURE TO COVID-19: Primary | ICD-10-CM

## 2021-04-06 PROCEDURE — U0005 INFEC AGEN DETEC AMPLI PROBE: HCPCS | Performed by: NURSE PRACTITIONER

## 2021-04-06 PROCEDURE — U0003 INFECTIOUS AGENT DETECTION BY NUCLEIC ACID (DNA OR RNA); SEVERE ACUTE RESPIRATORY SYNDROME CORONAVIRUS 2 (SARS-COV-2) (CORONAVIRUS DISEASE [COVID-19]), AMPLIFIED PROBE TECHNIQUE, MAKING USE OF HIGH THROUGHPUT TECHNOLOGIES AS DESCRIBED BY CMS-2020-01-R: HCPCS | Performed by: NURSE PRACTITIONER

## 2021-04-06 PROCEDURE — 99213 OFFICE O/P EST LOW 20 MIN: CPT | Performed by: NURSE PRACTITIONER

## 2021-04-06 NOTE — PROGRESS NOTES
[facility]        NAME: Pierce Naidu is a 35 y o  male  : 1980    MRN: [id number]  DATE: 2021  TIME: 1:23 PM    Assessment and Plan   Contact with and (suspected) exposure to covid-19 [Z20 822]  1  Contact with and (suspected) exposure to covid-19  Novel Coronavirus (Covid-19),PCR [facility] - Office Collection         Patient Instructions     Covid tested; results in 2-3 days via mychart  Stay quarantined   Follow up with PCP in 3-5 days  Proceed to  ER if symptoms worsen  Chief Complaint     Chief Complaint   Patient presents with    Headache    COVID-19         History of Present Illness       HPI   Reports he was exposed to someone at work who is positive for covid 19  Having minimal intermittent headache  Review of Systems   Review of Systems   Constitutional: Negative for chills and fever  HENT: Negative for sore throat and trouble swallowing  Respiratory: Negative for cough, chest tightness, shortness of breath and wheezing  Cardiovascular: Negative for chest pain  Gastrointestinal: Negative for nausea and vomiting  Neurological: Positive for headaches  Current Medications       Current Outpatient Medications:     fluticasone (FLONASE) 50 mcg/act nasal spray, 1 spray into each nostril daily (Patient not taking: Reported on 2021), Disp: 1 Bottle, Rfl: 0    phenylephrine (SUDAFED PE) 10 MG TABS, Take 1 tablet (10 mg total) by mouth every 6 (six) hours for 4 days, Disp: 16 tablet, Rfl: 0    Current Allergies     Allergies as of 2021    (No Known Allergies)            The following portions of the patient's history were reviewed and updated as appropriate: allergies, current medications, past family history, past medical history, past social history, past surgical history and problem list      History reviewed  No pertinent past medical history  History reviewed  No pertinent surgical history      Family History   Problem Relation Age of Onset  Diabetes Mother     Hypertension Mother     Stroke Mother     Hyperlipidemia Sister          Medications have been verified  Objective   Pulse 82   Temp 97 6 °F (36 4 °C)   Resp 18   SpO2 100%   No LMP for male patient  Physical Exam     Physical Exam  Constitutional:       Appearance: He is not ill-appearing or diaphoretic  HENT:      Right Ear: Tympanic membrane and ear canal normal       Left Ear: Tympanic membrane and ear canal normal       Nose: Rhinorrhea present  Mouth/Throat:      Pharynx: No posterior oropharyngeal erythema  Cardiovascular:      Rate and Rhythm: Regular rhythm  Heart sounds: Normal heart sounds  Pulmonary:      Effort: Pulmonary effort is normal       Breath sounds: Normal breath sounds  Neurological:      Mental Status: He is alert

## 2021-04-07 LAB — SARS-COV-2 RNA RESP QL NAA+PROBE: NEGATIVE

## 2021-04-11 ENCOUNTER — HOSPITAL ENCOUNTER (EMERGENCY)
Facility: HOSPITAL | Age: 41
Discharge: HOME | End: 2021-04-11
Attending: EMERGENCY MEDICINE | Admitting: EMERGENCY MEDICINE
Payer: COMMERCIAL

## 2021-04-11 ENCOUNTER — APPOINTMENT (EMERGENCY)
Dept: RADIOLOGY | Facility: HOSPITAL | Age: 41
End: 2021-04-11
Payer: COMMERCIAL

## 2021-04-11 VITALS
DIASTOLIC BLOOD PRESSURE: 83 MMHG | BODY MASS INDEX: 36.16 KG/M2 | TEMPERATURE: 98.8 F | RESPIRATION RATE: 20 BRPM | SYSTOLIC BLOOD PRESSURE: 141 MMHG | OXYGEN SATURATION: 100 % | HEIGHT: 76 IN | HEART RATE: 78 BPM | WEIGHT: 296.96 LBS

## 2021-04-11 DIAGNOSIS — U07.1 COVID-19: Primary | ICD-10-CM

## 2021-04-11 DIAGNOSIS — R53.1 WEAKNESS: ICD-10-CM

## 2021-04-11 LAB
ALBUMIN SERPL BCP-MCNC: 3.7 G/DL (ref 3.5–5)
ALP SERPL-CCNC: 71 U/L (ref 46–116)
ALT SERPL W P-5'-P-CCNC: 51 U/L (ref 12–78)
ANION GAP SERPL CALCULATED.3IONS-SCNC: 2 MMOL/L (ref 4–13)
APAP SERPL-MCNC: <2 UG/ML (ref 10–20)
AST SERPL W P-5'-P-CCNC: 32 U/L (ref 5–45)
BASOPHILS # BLD AUTO: 0.01 THOUSANDS/ΜL (ref 0–0.1)
BASOPHILS NFR BLD AUTO: 0 % (ref 0–1)
BILIRUB SERPL-MCNC: 0.35 MG/DL (ref 0.2–1)
BUN SERPL-MCNC: 16 MG/DL (ref 5–25)
CALCIUM SERPL-MCNC: 8.3 MG/DL (ref 8.3–10.1)
CHLORIDE SERPL-SCNC: 111 MMOL/L (ref 100–108)
CK MB SERPL-MCNC: 1.1 NG/ML (ref 0–5)
CK MB SERPL-MCNC: <1 % (ref 0–2.5)
CK SERPL-CCNC: 557 U/L (ref 39–308)
CO2 SERPL-SCNC: 28 MMOL/L (ref 21–32)
CREAT SERPL-MCNC: 1.33 MG/DL (ref 0.6–1.3)
EOSINOPHIL # BLD AUTO: 0.05 THOUSAND/ΜL (ref 0–0.61)
EOSINOPHIL NFR BLD AUTO: 1 % (ref 0–6)
ERYTHROCYTE [DISTWIDTH] IN BLOOD BY AUTOMATED COUNT: 14.7 % (ref 11.6–15.1)
ETHANOL SERPL-MCNC: <3 MG/DL (ref 0–3)
FLUAV RNA RESP QL NAA+PROBE: NEGATIVE
FLUBV RNA RESP QL NAA+PROBE: NEGATIVE
GFR SERPL CREATININE-BSD FRML MDRD: 75 ML/MIN/1.73SQ M
GLUCOSE SERPL-MCNC: 84 MG/DL (ref 65–140)
GLUCOSE SERPL-MCNC: 86 MG/DL (ref 65–140)
HCT VFR BLD AUTO: 40 % (ref 36.5–49.3)
HGB BLD-MCNC: 13.8 G/DL (ref 12–17)
IMM GRANULOCYTES # BLD AUTO: 0.02 THOUSAND/UL (ref 0–0.2)
IMM GRANULOCYTES NFR BLD AUTO: 1 % (ref 0–2)
LYMPHOCYTES # BLD AUTO: 0.84 THOUSANDS/ΜL (ref 0.6–4.47)
LYMPHOCYTES NFR BLD AUTO: 21 % (ref 14–44)
MCH RBC QN AUTO: 25.9 PG (ref 26.8–34.3)
MCHC RBC AUTO-ENTMCNC: 34.5 G/DL (ref 31.4–37.4)
MCV RBC AUTO: 75 FL (ref 82–98)
MONOCYTES # BLD AUTO: 0.47 THOUSAND/ΜL (ref 0.17–1.22)
MONOCYTES NFR BLD AUTO: 12 % (ref 4–12)
NEUTROPHILS # BLD AUTO: 2.67 THOUSANDS/ΜL (ref 1.85–7.62)
NEUTS SEG NFR BLD AUTO: 65 % (ref 43–75)
NRBC BLD AUTO-RTO: 0 /100 WBCS
PLATELET # BLD AUTO: 218 THOUSANDS/UL (ref 149–390)
PMV BLD AUTO: 11.8 FL (ref 8.9–12.7)
POTASSIUM SERPL-SCNC: 3.9 MMOL/L (ref 3.5–5.3)
PROT SERPL-MCNC: 7.3 G/DL (ref 6.4–8.2)
RBC # BLD AUTO: 5.33 MILLION/UL (ref 3.88–5.62)
RSV RNA RESP QL NAA+PROBE: NEGATIVE
SALICYLATES SERPL-MCNC: <3 MG/DL (ref 3–20)
SARS-COV-2 RNA RESP QL NAA+PROBE: POSITIVE
SODIUM SERPL-SCNC: 141 MMOL/L (ref 136–145)
TROPONIN I SERPL-MCNC: <0.02 NG/ML
WBC # BLD AUTO: 4.06 THOUSAND/UL (ref 4.31–10.16)

## 2021-04-11 PROCEDURE — 80053 COMPREHEN METABOLIC PANEL: CPT | Performed by: EMERGENCY MEDICINE

## 2021-04-11 PROCEDURE — 82077 ASSAY SPEC XCP UR&BREATH IA: CPT | Performed by: EMERGENCY MEDICINE

## 2021-04-11 PROCEDURE — 36415 COLL VENOUS BLD VENIPUNCTURE: CPT | Performed by: EMERGENCY MEDICINE

## 2021-04-11 PROCEDURE — 93005 ELECTROCARDIOGRAM TRACING: CPT

## 2021-04-11 PROCEDURE — 96365 THER/PROPH/DIAG IV INF INIT: CPT

## 2021-04-11 PROCEDURE — 80179 DRUG ASSAY SALICYLATE: CPT | Performed by: EMERGENCY MEDICINE

## 2021-04-11 PROCEDURE — 0241U HB NFCT DS VIR RESP RNA 4 TRGT: CPT | Performed by: EMERGENCY MEDICINE

## 2021-04-11 PROCEDURE — 85025 COMPLETE CBC W/AUTO DIFF WBC: CPT | Performed by: EMERGENCY MEDICINE

## 2021-04-11 PROCEDURE — 84484 ASSAY OF TROPONIN QUANT: CPT | Performed by: EMERGENCY MEDICINE

## 2021-04-11 PROCEDURE — 96366 THER/PROPH/DIAG IV INF ADDON: CPT

## 2021-04-11 PROCEDURE — 99285 EMERGENCY DEPT VISIT HI MDM: CPT

## 2021-04-11 PROCEDURE — 82550 ASSAY OF CK (CPK): CPT | Performed by: EMERGENCY MEDICINE

## 2021-04-11 PROCEDURE — 99285 EMERGENCY DEPT VISIT HI MDM: CPT | Performed by: EMERGENCY MEDICINE

## 2021-04-11 PROCEDURE — 82948 REAGENT STRIP/BLOOD GLUCOSE: CPT

## 2021-04-11 PROCEDURE — 82553 CREATINE MB FRACTION: CPT | Performed by: EMERGENCY MEDICINE

## 2021-04-11 PROCEDURE — 80143 DRUG ASSAY ACETAMINOPHEN: CPT | Performed by: EMERGENCY MEDICINE

## 2021-04-11 PROCEDURE — 71045 X-RAY EXAM CHEST 1 VIEW: CPT

## 2021-04-11 RX ORDER — SODIUM CHLORIDE, SODIUM GLUCONATE, SODIUM ACETATE, POTASSIUM CHLORIDE, MAGNESIUM CHLORIDE, SODIUM PHOSPHATE, DIBASIC, AND POTASSIUM PHOSPHATE .53; .5; .37; .037; .03; .012; .00082 G/100ML; G/100ML; G/100ML; G/100ML; G/100ML; G/100ML; G/100ML
1000 INJECTION, SOLUTION INTRAVENOUS ONCE
Status: COMPLETED | OUTPATIENT
Start: 2021-04-11 | End: 2021-04-11

## 2021-04-11 RX ORDER — MULTIVITAMIN
1 TABLET ORAL DAILY
Qty: 15 TABLET | Refills: 0 | Status: SHIPPED | OUTPATIENT
Start: 2021-04-11 | End: 2022-03-30

## 2021-04-11 RX ORDER — MELATONIN
2000 DAILY
Qty: 30 TABLET | Refills: 0 | Status: SHIPPED | OUTPATIENT
Start: 2021-04-11

## 2021-04-11 RX ORDER — MULTIVIT WITH MINERALS/LUTEIN
1000 TABLET ORAL 2 TIMES DAILY
Qty: 30 TABLET | Refills: 0 | Status: SHIPPED | OUTPATIENT
Start: 2021-04-11 | End: 2022-03-30

## 2021-04-11 RX ADMIN — SODIUM CHLORIDE, SODIUM GLUCONATE, SODIUM ACETATE, POTASSIUM CHLORIDE, MAGNESIUM CHLORIDE, SODIUM PHOSPHATE, DIBASIC, AND POTASSIUM PHOSPHATE 1000 ML: .53; .5; .37; .037; .03; .012; .00082 INJECTION, SOLUTION INTRAVENOUS at 14:55

## 2021-04-11 NOTE — Clinical Note
Nikhil Markscock was seen and treated in our emergency department on 4/11/2021  Diagnosis: COVID 19    Cheryl De  may return to work on return date  He may return on this date: 04/21/2021    Must be fever free for 2 days  If you have any questions or concerns, please don't hesitate to call        Yue Ireland MD    ______________________________           _______________          _______________  Hospital Representative                              Date                                Time

## 2021-04-11 NOTE — DISCHARGE INSTRUCTIONS
Take the prescribed vitamins as directed on your prescriptions  You should also purchase a pulse oximeter which measures your oxygen levels for at home  You can get this at any pharmacy or on [employer]  You should return to the emergency department if your oxygen level drops below 90%  Be sure to follow-up with the [facility] clinic as discussed for possible convalescent plasma  This will help your body fight the COVID-19 infection  Please return to the emergency department if you develop difficulty breathing, chest pain, cannot eat/drink, or anything else concerning to you  [address]    Your healthcare provider and/or public health staff have evaluated you and have determined that you do not need to remain in the hospital at this time  At this time you can be isolated at home where you will be monitored by staff from your local or state health department  You should carefully follow the prevention and isolation steps below until a healthcare provider or local or state health department says that you can return to your normal activities  Stay home except to get medical care    People who are mildly ill with COVID-19 are able to isolate at home during their illness  You should restrict activities outside your home, except for getting medical care  Do not go to work, school, or public areas  Avoid using public transportation, ride-sharing, or taxis  Separate yourself from other people and animals in your home    People: As much as possible, you should stay in a specific room and away from other people in your home  Also, you should use a separate bathroom, if available  Animals: You should restrict contact with pets and other animals while you are sick with COVID-19, just like you would around other people   Although there have not been reports of pets or other animals becoming sick with COVID-19, it is still recommended that people sick with COVID-19 limit contact with animals until more information is known about the virus  When possible, have another member of your household care for your animals while you are sick  If you are sick with COVID-19, avoid contact with your pet, including petting, snuggling, being kissed or licked, and sharing food  If you must care for your pet or be around animals while you are sick, wash your hands before and after you interact with pets and wear a facemask  See COVID-19 and Animals for more information  Call ahead before visiting your doctor    If you have a medical appointment, call the healthcare provider and tell them that you have or may have COVID-19  This will help the healthcare providers office take steps to keep other people from getting infected or exposed  Wear a facemask    You should wear a facemask when you are around other people (e g , sharing a room or vehicle) or pets and before you enter a healthcare providers office  If you are not able to wear a facemask (for example, because it causes trouble breathing), then people who live with you should not stay in the same room with you, or they should wear a facemask if they enter your room  Cover your coughs and sneezes    Cover your mouth and nose with a tissue when you cough or sneeze  Throw used tissues in a lined trash can  Immediately wash your hands with soap and water for at least 20 seconds or, if soap and water are not available, clean your hands with an alcohol-based hand  that contains at least 60% alcohol  Clean your hands often    Wash your hands often with soap and water for at least 20 seconds, especially after blowing your nose, coughing, or sneezing; going to the bathroom; and before eating or preparing food  If soap and water are not readily available, use an alcohol-based hand  with at least 60% alcohol, covering all surfaces of your hands and rubbing them together until they feel dry    Soap and water are the best option if hands are visibly dirty  Avoid touching your eyes, nose, and mouth with unwashed hands  Avoid sharing personal household items    You should not share dishes, drinking glasses, cups, eating utensils, towels, or bedding with other people or pets in your home  After using these items, they should be washed thoroughly with soap and water  Clean all high-touch surfaces everyday    High touch surfaces include counters, tabletops, doorknobs, bathroom fixtures, toilets, phones, keyboards, tablets, and bedside tables  Also, clean any surfaces that may have blood, stool, or body fluids on them  Use a household cleaning spray or wipe, according to the label instructions  Labels contain instructions for safe and effective use of the cleaning product including precautions you should take when applying the product, such as wearing gloves and making sure you have good ventilation during use of the product  Monitor your symptoms    Seek prompt medical attention if your illness is worsening (e g , difficulty breathing)  Before seeking care, call your healthcare provider and tell them that you have, or are being evaluated for, COVID-19  Put on a facemask before you enter the facility  These steps will help the healthcare providers office to keep other people in the office or waiting room from getting infected or exposed  Ask your healthcare provider to call the local or state health department  Persons who are placed under active monitoring or facilitated self-monitoring should follow instructions provided by their local health department or occupational health professionals, as appropriate  If you have a medical emergency and need to call 911, notify the dispatch personnel that you have, or are being evaluated for COVID-19  If possible, put on a facemask before emergency medical services arrive      Discontinuing home isolation    Patients with confirmed COVID-19 should remain under home isolation precautions until the following conditions are met: They have had no fever for at least 24 hours (that is one full day of no fever without the use medicine that reduces fevers)  AND  other symptoms have improved (for example, when their cough or shortness of breath have improved)  AND  If had mild or moderate illness, at least 10 days have passed since their symptoms first appeared or if severe illness (needed oxygen) or immunosuppressed, at least 20 days have passed since symptoms first appeared  Patients with confirmed COVID-19 should also notify close contacts (including their workplace) and ask that they self-quarantine  Currently, close contact is defined as being within 6 feet for 15 minutes or more from the period 24 hours starting 48 hours before symptom onset to the time at which the patient went into isolation  Close contacts of patients diagnosed with COVID-19 should be instructed by the patient to self-quarantine for 14 days from the last time of their last contact with the patient       Source: [employer]

## 2021-04-11 NOTE — ED NOTES
Pt ambulated around room  HR remained 99 and O2 remained 99% on RA  Pt denies dizziness and SOB  Dr Harper Contreras aware       Tawny Turner RN  04/11/21 [id number]

## 2021-04-11 NOTE — ED PROVIDER NOTES
History  Chief Complaint   Patient presents with    Weakness - Generalized     Pt "I have been feeling weak and tired since Friday  I have a coworker that tested + for Covid but the Urgent Care told me I tested negative" Pt denies CP and SOB     13-year-old male who reports no past medical history, although, has hypertension, hyperlipidemia, obstructive sleep apnea on chart review who presents for evaluation of generalized weakness  Patient reports that he does not generally follow with a doctor because he does not like to take medications  He reports that on Friday, he developed mild generalized weakness that has progressively worsened  Today, he went to work and felt as if he was unable to perform the tasks of his job because he was so weak  He has had poor oral intake secondary to minimal appetite  He reports that the only thing he seen in the past 24 hours was a christine  He had a mild diffuse headache last night for which he took Excedrin  This headache has since resolved  He was exposed to a co-worker with COVID-19 approximately 2 weeks ago  He had COVID-19 testing on 2021 which was negative  He does report occasional mild cough that is nonproductive  He denies fever, chest pain, shortness of breath, abdominal pain, nausea  He has not been taking any medications for his symptoms besides occasional marijuana use  Prior to Admission Medications   Prescriptions Last Dose Informant Patient Reported? Taking?   fluticasone (FLONASE) 50 mcg/act nasal spray Not Taking at Unknown time  No No   Si spray into each nostril daily   Patient not taking: Reported on 2021   phenylephrine (SUDAFED PE) 10 MG TABS   No No   Sig: Take 1 tablet (10 mg total) by mouth every 6 (six) hours for 4 days      Facility-Administered Medications: None       No past medical history on file  No past surgical history on file      Family History   Problem Relation Age of Onset    Diabetes Mother    Sandra Benson Hypertension Mother     Stroke Mother     Hyperlipidemia Sister      I have reviewed and agree with the history as documented  E-Cigarette/Vaping     E-Cigarette/Vaping Substances     Social History     Tobacco Use    Smoking status: Former Smoker    Smokeless tobacco: Never Used   Substance Use Topics    Alcohol use: Yes     Frequency: 2-4 times a month     Comment: Social    Drug use: Yes     Types: Marijuana     Comment: last used: 4/9/21        Review of Systems   Constitutional: Positive for appetite change  Negative for chills and fever  HENT: Negative for congestion, rhinorrhea and sore throat  Eyes: Negative for visual disturbance  Respiratory: Negative for cough, chest tightness and shortness of breath  Cardiovascular: Negative for chest pain and leg swelling  Gastrointestinal: Negative for abdominal distention, abdominal pain, diarrhea, nausea and vomiting  Genitourinary: Negative for dysuria, flank pain, frequency and urgency  Musculoskeletal: Negative for back pain and gait problem  Skin: Negative for pallor and rash  Neurological: Positive for weakness (Generalized)  Negative for syncope, light-headedness and headaches  All other systems reviewed and are negative  Physical Exam  ED Triage Vitals [04/11/21 1343]   Temperature Pulse Respirations Blood Pressure SpO2   98 8 °F (37 1 °C) 99 22 148/84 98 %      Temp Source Heart Rate Source Patient Position - Orthostatic VS BP Location FiO2 (%)   Oral Monitor Sitting Right arm --      Pain Score       --             Orthostatic Vital Signs  Vitals:    04/11/21 1500 04/11/21 1600 04/11/21 1630 04/11/21 1712   BP: 135/85 136/86 141/83    Pulse: 74 82 78 78   Patient Position - Orthostatic VS: Sitting Sitting Sitting        Physical Exam  Vitals signs and nursing note reviewed  Constitutional:       General: He is not in acute distress  Appearance: He is diaphoretic  HENT:      Head: Normocephalic and atraumatic  Nose: Nose normal       Mouth/Throat:      Mouth: Mucous membranes are moist       Pharynx: No oropharyngeal exudate or posterior oropharyngeal erythema  Eyes:      Extraocular Movements: Extraocular movements intact  Pupils: Pupils are equal, round, and reactive to light  Neck:      Musculoskeletal: Normal range of motion and neck supple  No neck rigidity  Cardiovascular:      Rate and Rhythm: Normal rate and regular rhythm  Heart sounds: No murmur  No friction rub  No gallop  Pulmonary:      Effort: Pulmonary effort is normal       Breath sounds: Normal breath sounds  No wheezing or rales  Abdominal:      General: Bowel sounds are normal  There is no distension  Palpations: Abdomen is soft  Tenderness: There is no abdominal tenderness  Musculoskeletal: Normal range of motion  General: No swelling or tenderness  Skin:     General: Skin is warm  Coloration: Skin is not pale  Findings: No rash  Neurological:      General: No focal deficit present  Mental Status: He is alert and oriented to person, place, and time  Comments: Speech normal, no dysarthria  Cranial nerves 2-12 intact  5/5 strength in bilateral upper and lower extremities  Sensation intact in the bilateral upper and lower extremities  No dysmetria  Psychiatric:         Behavior: Behavior normal          ED Medications  Medications   multi-electrolyte (ISOLYTE-S PH 7 4) bolus 1,000 mL (0 mL Intravenous Stopped 4/11/21 1642)       Diagnostic Studies  Results Reviewed     Procedure Component Value Units Date/Time    COVID19, Influenza A/B, RSV PCR, [facility] [[id number]]  (Abnormal) Collected: 04/11/21 1417    Lab Status: Final result Specimen: Nares from Nose Updated: 04/11/21 2729     SARS-CoV-2 Positive     INFLUENZA A PCR Negative     INFLUENZA B PCR Negative     RSV PCR Negative    Narrative:        This test has been authorized by FDA under an EUA (Emergency Use Assay) for use by authorized laboratories  Clinical caution and judgement should be used with the interpretation of these results with consideration of the clinical impression and other laboratory testing  Testing reported as "Positive" or "Negative" has been proven to be accurate according to standard laboratory validation requirements  All testing is performed with control materials showing appropriate reactivity at standard intervals  CKMB [[id number]]  (Normal) Collected: 04/11/21 1417    Lab Status: Final result Specimen: Blood from Arm, Left Updated: 04/11/21 1538     CK-MB Index <1 0 %      CK-MB 1 1 ng/mL     CK (with reflex to MB) [[id number]]  (Abnormal) Collected: 04/11/21 1417    Lab Status: Final result Specimen: Blood from Arm, Left Updated: 04/11/21 1521     Total  U/L     Acetaminophen level-If concentration is detectable, please discuss with medical  on call   [[id number]]  (Abnormal) Collected: 04/11/21 1417    Lab Status: Final result Specimen: Blood from Arm, Left Updated: 04/11/21 1443     Acetaminophen Level <2 ug/mL     Comprehensive metabolic panel [[id number]]  (Abnormal) Collected: 04/11/21 1417    Lab Status: Final result Specimen: Blood from Arm, Left Updated: 04/11/21 1443     Sodium 141 mmol/L      Potassium 3 9 mmol/L      Chloride 111 mmol/L      CO2 28 mmol/L      ANION GAP 2 mmol/L      BUN 16 mg/dL      Creatinine 1 33 mg/dL      Glucose 86 mg/dL      Calcium 8 3 mg/dL      AST 32 U/L      ALT 51 U/L      Alkaline Phosphatase 71 U/L      Total Protein 7 3 g/dL      Albumin 3 7 g/dL      Total Bilirubin 0 35 mg/dL      eGFR 75 ml/min/1 73sq m     Narrative:      [place] guidelines for Chronic Kidney Disease (CKD):     Stage 1 with normal or high GFR (GFR > 90 mL/min/1 73 square meters)    Stage 2 Mild CKD (GFR = 60-89 mL/min/1 73 square meters)    Stage 3A Moderate CKD (GFR = 45-59 mL/min/1 73 square meters)    Stage 3B Moderate CKD (GFR = 30-44 mL/min/1 73 square meters)    Stage 4 Severe CKD (GFR = 15-29 mL/min/1 73 square meters)    Stage 5 End Stage CKD (GFR <15 mL/min/1 73 square meters)  Note: GFR calculation is accurate only with a steady state creatinine    Salicylate level [[id number]]  (Abnormal) Collected: 04/11/21 1417    Lab Status: Final result Specimen: Blood from Arm, Left Updated: 98/55/03 8879     Salicylate Lvl <3 mg/dL     Troponin I [[id number]]  (Normal) Collected: 04/11/21 1417    Lab Status: Final result Specimen: Blood from Arm, Left Updated: 04/11/21 1442     Troponin I <0 02 ng/mL     Ethanol [[id number]]  (Normal) Collected: 04/11/21 1417    Lab Status: Final result Specimen: Blood from Arm, Left Updated: 04/11/21 1436     Ethanol Lvl <3 mg/dL     CBC and differential [[id number]]  (Abnormal) Collected: 04/11/21 1417    Lab Status: Final result Specimen: Blood from Arm, Left Updated: 04/11/21 1424     WBC 4 06 Thousand/uL      RBC 5 33 Million/uL      Hemoglobin 13 8 g/dL      Hematocrit 40 0 %      MCV 75 fL      MCH 25 9 pg      MCHC 34 5 g/dL      RDW 14 7 %      MPV 11 8 fL      Platelets 871 Thousands/uL      nRBC 0 /100 WBCs      Neutrophils Relative 65 %      Immat GRANS % 1 %      Lymphocytes Relative 21 %      Monocytes Relative 12 %      Eosinophils Relative 1 %      Basophils Relative 0 %      Neutrophils Absolute 2 67 Thousands/µL      Immature Grans Absolute 0 02 Thousand/uL      Lymphocytes Absolute 0 84 Thousands/µL      Monocytes Absolute 0 47 Thousand/µL      Eosinophils Absolute 0 05 Thousand/µL      Basophils Absolute 0 01 Thousands/µL     Fingerstick Glucose (POCT) [[id number]]  (Normal) Collected: 04/11/21 1405    Lab Status: Final result Updated: 04/11/21 1406     POC Glucose 84 mg/dl                  XR chest 1 view portable   Final Result by Margarita Barraza MD (04/11 1553)      No acute cardiopulmonary disease                    Workstation performed: [id number]               Procedures  Procedures      ED Course  ED Course as of Apr 11 1719   Sun Apr 11, 2021   1413 Procedure Note: EKG  Date/Time: 04/11/21 13:50   Interpreted by: Kumar Marroquin  Indications / Diagnosis: weakness, diaphoresis  ECG reviewed by me, the ED Provider: yes   The EKG demonstrates:  Rhythm: rate 90, normal sinus  Intervals: normal intervals  Axis: normal axis  QRS/Blocks: normal QRS  ST Changes: No acute ST Changes, no STD/JUVENAL          1425 CBC and differential(!)   1442 Troponin I: <0 02   1444 1 56 2 years ago   Creatinine(!): 1 33   1445 Comprehensive metabolic panel(!)   4395 Coma panel(!)   1522 Total CK(!): 557   1621 SARS-COV-2(!): Positive   1621 COVID 19 positive  Pt qualifies for convalescent plasma  Will advise follow up with Joanna Wheeler  Message sent to [facility]  MDM  Number of Diagnoses or Management Options  COVID-19: new and requires workup  Weakness: new and requires workup  Diagnosis management comments: 75-year-old male who presents for evaluation of generalized weakness  Differential diagnoses include but not limited to COVID-19 infection, electrolyte abnormality, atypical ACS  Plan to obtain cardiac workup, electrolytes, COVID-19 testing  Labs showing slightly elevated creatinine at 1 3, however, this is improved compared to 1 5 previously  CK is mildly elevated at 500, patient was given a L of IV fluids  COVID-19 testing was positive  Patient was given prescriptions for vitamin-D, vitamin-C, multivitamin  Ambulatory pulse ox remained stable  Patient was advised to follow-up with [facility] for convalescent plasma  Patient was also advised to follow up with them regarding his slightly elevated creatinine  Return precautions discussed         Amount and/or Complexity of Data Reviewed  Clinical lab tests: ordered and reviewed  Tests in the radiology section of CPT®: reviewed and ordered  Decide to obtain previous medical records or to obtain history from someone other than the patient: yes  Review and summarize past medical records: yes    Risk of Complications, Morbidity, and/or Mortality  Presenting problems: high  Diagnostic procedures: low  Management options: minimal    Patient Progress  Patient progress: stable      Disposition  Final diagnoses:   Weakness   COVID-19     Time reflects when diagnosis was documented in both MDM as applicable and the Disposition within this note     Time User Action Codes Description Comment    4/11/2021  4:52 PM Dawit Bubba Add [R53 1] Weakness     4/11/2021  4:52 PM Dawit Bubba Add [U07 1] COVID-19     4/11/2021  4:53 PM Dawit Bubba Modify [R53 1] Weakness     4/11/2021  4:53 PM Dawit Bubba Modify [U07 1] COVID-19       ED Disposition     ED Disposition Condition Date/Time Comment    Discharge Stable Sun Apr 11, 2021  4:52 PM Ganga Contreras discharge to home/self care              Follow-up Information     Follow up With Specialties Details Why Contact Info Additional [address] [facility] Schedule an appointment as soon as possible for a visit  For convalescent plasma [address], [address] [facility] [address]  [address] [place] [address], [address] [place] [address] Rd, [address] [place], [place], [address]          Patient's Medications   Discharge Prescriptions    ASCORBIC ACID (VITAMIN C) 1000 MG TABLET    Take 1 tablet (1,000 mg total) by mouth 2 (two) times a day for 15 days       Start Date: 4/11/2021 End Date: 4/26/2021       Order Dose: 1,000 mg       Quantity: 30 tablet    Refills: 0    CHOLECALCIFEROL (VITAMIN D3) 1,000 UNITS TABLET    Take 2 tablets (2,000 Units total) by mouth daily       Start Date: 4/11/2021 End Date: --       Order Dose: 2,000 Units       Quantity: 30 tablet    Refills: 0    MULTIPLE VITAMIN (MULTIVITAMIN) TABLET    Take 1 tablet by mouth daily for 15 days       Start Date: 4/11/2021 End Date: 4/26/2021       Order Dose: 1 tablet       Quantity: 15 tablet    Refills: 0     No discharge procedures on file  PDMP Review     None           ED Provider  Attending physically available and evaluated Mac Rosas I managed the patient along with the ED Attending      Electronically Signed by         Nika Prakash MD  04/11/21 5212

## 2021-04-11 NOTE — ED ATTENDING ATTESTATION
4/11/2021  I, Rodolfo Kam MD, saw and evaluated the patient  I have discussed the patient with the resident/non-physician practitioner and agree with the resident's/non-physician practitioner's findings, Plan of Care, and MDM as documented in the resident's/non-physician practitioner's note, except where noted  All available labs and Radiology studies were reviewed  I was present for key portions of any procedure(s) performed by the resident/non-physician practitioner and I was immediately available to provide assistance  At this point I agree with the current assessment done in the Emergency Department  I have conducted an independent evaluation of this patient a history and physical is as follows:    OA: 34 y/o f c/o generalized weakness x few days, worsened today  Decreased PO intake secondary to decreased appetite  + occasional nonproductive cough  + headache last night, currently resolved  No cp/sob  No known fevers/chills  Recent covid exposure with negative test earlier in the week  No abd pain  No n/v/d  No urinary sxms  No back pain  No focal numbness/weakness/tingling  PE, NAD, VSS, NC/AT, MMM, neck supple/FROM, RR, lungs CTAB, -w/r, abd soft, Nt/ND, +Bs, -r/g, - edema, - calf ttp, + 2 distal pulses and capillary < 2 sec, AAO  A/p concern for COVID given weakness with occasional cough  Denies CP/cardiac sxms but given weakness and pt admits he does not take medication or visit doctors, will check EKG and trop x 1  Basic labs, CXR  Treat accordingly  ED Course     PT with improved Cr from previous  Admits to using protein powders/drinks  CK added  Overall renal function improved from previous  Pt currently receiving IVF hydration  Pt feels well  Denies any issues with ambulatory pulse ox, felt fine  Understands f/u and return precuations as discussed       Critical Care Time  Procedures

## 2021-04-12 ENCOUNTER — TELEPHONE (OUTPATIENT)
Dept: FAMILY MEDICINE CLINIC | Facility: CLINIC | Age: 41
End: 2021-04-12

## 2021-04-12 LAB
ATRIAL RATE: 90 BPM
P AXIS: 70 DEGREES
PR INTERVAL: 138 MS
QRS AXIS: 83 DEGREES
QRSD INTERVAL: 84 MS
QT INTERVAL: 340 MS
QTC INTERVAL: 415 MS
T WAVE AXIS: 47 DEGREES
VENTRICULAR RATE: 90 BPM

## 2021-04-12 PROCEDURE — 93010 ELECTROCARDIOGRAM REPORT: CPT | Performed by: INTERNAL MEDICINE

## 2021-04-12 NOTE — TELEPHONE ENCOUNTER
Abi Heath returned my call  He is going to reach out to  for a telemedicine visit  Advised to call back if he needs any additional help

## 2021-04-12 NOTE — TELEPHONE ENCOUNTER
----- Message from Leonidas Vasquez MD sent at 4/11/2021  4:14 PM EDT -----  Regarding: Convalescent Plasma  Hello,    This patient is COVID 19 positive and eligible for convalescent plasma   I have advised him to follow up with Kristy Herzog      Thank you  Mima Kanner

## 2021-04-12 NOTE — TELEPHONE ENCOUNTER
Made outreach call  Left message for Sarah Grant to call back and also advised to reach out to Jennifer Damian for appointment

## 2021-04-13 ENCOUNTER — TELEMEDICINE (OUTPATIENT)
Dept: INTERNAL MEDICINE CLINIC | Facility: CLINIC | Age: 41
End: 2021-04-13

## 2021-04-13 DIAGNOSIS — I10 ESSENTIAL HYPERTENSION: ICD-10-CM

## 2021-04-13 DIAGNOSIS — G47.33 OBSTRUCTIVE SLEEP APNEA: ICD-10-CM

## 2021-04-13 DIAGNOSIS — U07.1 COVID-19 VIRUS INFECTION: ICD-10-CM

## 2021-04-13 DIAGNOSIS — U07.1 COVID-19: Primary | ICD-10-CM

## 2021-04-13 PROBLEM — R79.89 ELEVATED SERUM CREATININE: Status: ACTIVE | Noted: 2021-04-13

## 2021-04-13 PROCEDURE — 99213 OFFICE O/P EST LOW 20 MIN: CPT | Performed by: INTERNAL MEDICINE

## 2021-04-13 RX ORDER — ALBUTEROL SULFATE 90 UG/1
3 AEROSOL, METERED RESPIRATORY (INHALATION) ONCE AS NEEDED
Status: CANCELLED | OUTPATIENT
Start: 2021-04-14

## 2021-04-13 RX ORDER — SODIUM CHLORIDE 9 MG/ML
20 INJECTION, SOLUTION INTRAVENOUS ONCE
Status: CANCELLED | OUTPATIENT
Start: 2021-04-14

## 2021-04-13 RX ORDER — ACETAMINOPHEN 325 MG/1
650 TABLET ORAL ONCE AS NEEDED
Status: CANCELLED | OUTPATIENT
Start: 2021-04-14

## 2021-04-13 RX ORDER — SENNOSIDES 8.6 MG
650 CAPSULE ORAL EVERY 8 HOURS PRN
Qty: 30 TABLET | Refills: 1 | Status: SHIPPED | OUTPATIENT
Start: 2021-04-13

## 2021-04-13 NOTE — PROGRESS NOTES
COVID-19 Outpatient Progress Note    Assessment/Plan:    Problem List Items Addressed This Visit        Respiratory    Obstructive sleep apnea       Cardiovascular and Mediastinum    Essential hypertension       Other    COVID-19 virus infection      Other Visit Diagnoses     COVID-19    -  Primary    Relevant Medications    acetaminophen (TYLENOL) 650 mg CR tablet         Disposition:     I recommended continued isolation until at least 24 hours have passed since recovery defined as resolution of fever without the use of fever-reducing medications AND improvement in COVID symptoms AND 10 days have passed since onset of symptoms (or 10 days have passed since date of first positive viral diagnostic test for asymptomatic patients)  Outpatient antibody treatment was discussed with patient as he does qualify based on elevated BMI, hypertension, SAHIL  Risks and benefits were discussed  Due to risk of worsening illness and unclear benefit, patient wishes to defer antibiotic treatment at this time  He understands that it is only available early in the course of illness  He wishes to proceed with symptomatic treatment and rest at home  Will follow up with additional video visit on 04/15    I have spent 30 minutes directly with the patient  Greater than 50% of this time was spent in counseling/coordination of care regarding: diagnostic results, risks and benefits of treatment options and instructions for management  Encounter provider Geovanny Alvarado DO    Provider located at [address] [place] [address] Road  [address] [place] [address] 30  [facility] [address]  [address] [facility] [address] [phone]    Recent Visits  No visits were found meeting these conditions     Showing recent visits within past 7 days and meeting all other requirements     Today's Visits  Date Type Provider Dept   04/13/21 Telemedicine Cory Kim [facility] today's visits and meeting all other requirements Future Appointments  No visits were found meeting these conditions  Showing future appointments within next 150 days and meeting all other requirements      This virtual check-in was done via [employer] and patient was informed that this is a secure, HIPAA-compliant platform  He agrees to proceed  Patient agrees to participate in a virtual check in via telephone or video visit instead of presenting to the office to address urgent/immediate medical needs  Patient is aware this is a billable service  After connecting through [facility], the patient was identified by name and date of birth  Anna Pickens was informed that this was a telemedicine visit and that the exam was being conducted confidentially over secure lines  My office door was closed  No one else was in the room  Anna Pickens acknowledged consent and understanding of privacy and security of the telemedicine visit  I informed the patient that I have reviewed his record in Epic and presented the opportunity for him to ask any questions regarding the visit today  The patient agreed to participate  Subjective:   Anna Pickens is a 35 y o  male who has been screened for COVID-19  Symptom change since last report: improving  Patient's symptoms include fever, chills, fatigue, loss of taste, cough, chest tightness, abdominal pain (left side) and headache  Patient denies anosmia, shortness of breath, nausea, vomiting and diarrhea  Violet Barajas has been staying home and has isolated themselves in his home  He is taking care to not share personal items and is cleaning all surfaces that are touched often, like counters, tabletops, and doorknobs using household cleaning sprays or wipes  He is wearing a mask when he leaves his room  Date of exposure: 4/6/2021  Date of positive COVID-19 PCR: 4/11/2021    Initially was tested at urgent care on 4/6 when he found out he had previously been exposed   Did not develop symptoms until Friday 4/9, he felt slightly weak but wasn't too bad, got worse Sat night and went to hospital Sunday 4/11  Had been afebrile at work (per thermometer at work)  Was weak, fatigued, diaphoretic  No shortness of breath  Feels hot and chills overnight but no thermometer  One episode of diarrhea  Headache and eye pain is the worst  Takes Excedrin which did stop the HA for a little while  Left sided crampy abdominal pain, just started today  Was trying to do some work to see if he could, then developed this pain  Feels better than Sunday with regards to strength, but still with bothersome headache and eye pain  Also with knee pain, joint pain  Feels a little dizzy with standing  Admits that he has stopped using CPAP machine  Lab Results   Component Value Date    SARSCOV2 Positive (A) 04/11/2021     History reviewed  No pertinent past medical history  History reviewed  No pertinent surgical history  Current Outpatient Medications   Medication Sig Dispense Refill    Ascorbic Acid (vitamin C) 1000 MG tablet Take 1 tablet (1,000 mg total) by mouth 2 (two) times a day for 15 days 30 tablet 0    cholecalciferol (VITAMIN D3) 1,000 units tablet Take 2 tablets (2,000 Units total) by mouth daily 30 tablet 0    acetaminophen (TYLENOL) 650 mg CR tablet Take 1 tablet (650 mg total) by mouth every 8 (eight) hours as needed for mild pain or moderate pain (headache) 30 tablet 1    fluticasone (FLONASE) 50 mcg/act nasal spray 1 spray into each nostril daily (Patient not taking: Reported on 4/6/2021) 1 Bottle 0    Multiple Vitamin (multivitamin) tablet Take 1 tablet by mouth daily for 15 days (Patient not taking: Reported on 4/13/2021) 15 tablet 0    phenylephrine (SUDAFED PE) 10 MG TABS Take 1 tablet (10 mg total) by mouth every 6 (six) hours for 4 days 16 tablet 0     No current facility-administered medications for this visit  No Known Allergies    Review of Systems   Constitutional: Positive for chills, fatigue and fever  Respiratory: Positive for cough and chest tightness  Negative for shortness of breath  Gastrointestinal: Positive for abdominal pain (left side)  Negative for diarrhea, nausea and vomiting  Neurological: Positive for headaches  Objective:    Vitals:       Physical Exam  Constitutional:       General: He is not in acute distress  Appearance: He is well-developed  He is not ill-appearing  HENT:      Head: Normocephalic and atraumatic  Eyes:      General: No scleral icterus  Conjunctiva/sclera: Conjunctivae normal    Neck:      Musculoskeletal: Normal range of motion and neck supple  Pulmonary:      Effort: Pulmonary effort is normal  No respiratory distress  Comments: No conversational dyspnea  No cough  Skin:     General: Skin is warm and dry  Coloration: Skin is not pale  Neurological:      Mental Status: He is alert and oriented to person, place, and time  Psychiatric:         Mood and Affect: Mood normal          Behavior: Behavior normal        VIRTUAL VISIT DISCLAIMER    Emmy Escobar acknowledges that he has consented to an online visit or consultation  He understands that the online visit is based solely on information provided by him, and that, in the absence of a face-to-face physical evaluation by the physician, the diagnosis he receives is both limited and provisional in terms of accuracy and completeness  This is not intended to replace a full medical face-to-face evaluation by the physician  Emmy Escobar understands and accepts these terms

## 2021-04-20 ENCOUNTER — TELEMEDICINE (OUTPATIENT)
Dept: INTERNAL MEDICINE CLINIC | Facility: CLINIC | Age: 41
End: 2021-04-20

## 2021-04-20 VITALS — OXYGEN SATURATION: 99 %

## 2021-04-20 DIAGNOSIS — U07.1 COVID-19 VIRUS INFECTION: Primary | ICD-10-CM

## 2021-04-20 DIAGNOSIS — M62.81 MUSCLE WEAKNESS-GENERAL: ICD-10-CM

## 2021-04-20 DIAGNOSIS — R63.0 DECREASED APPETITE: ICD-10-CM

## 2021-04-20 PROCEDURE — 99212 OFFICE O/P EST SF 10 MIN: CPT | Performed by: PHYSICIAN ASSISTANT

## 2021-04-20 PROCEDURE — 1036F TOBACCO NON-USER: CPT | Performed by: PHYSICIAN ASSISTANT

## 2021-04-20 NOTE — LETTER
April 20, 2021     Patient: Garland Dietz   YOB: 1980   Date of Visit: 4/20/2021       To Whom it May Concern:    Garland Dietz is under my professional care  He was seen in my office on 4/20/2021  He may return to work on 4/26/2021  Abdirahman Faye is still recovering from COVID infection and needs additional time under quarantine for full recovery  He may return to work without restrictions on Monday April 26th  If you have any questions or concerns, please don't hesitate to call           Sincerely,          Eliazar Jang PA-C        CC: No Recipients

## 2021-04-20 NOTE — PROGRESS NOTES
COVID-19 Outpatient Progress Note    Assessment/Plan:    Problem List Items Addressed This Visit        Other    COVID-19 virus infection - Primary      Other Visit Diagnoses     Muscle weakness-general        Decreased appetite             Disposition:     I recommended continued isolation until at least 24 hours have passed since recovery defined as resolution of fever without the use of fever-reducing medications AND improvement in COVID symptoms AND 10 days have passed since onset of symptoms (or 10 days have passed since date of first positive viral diagnostic test for asymptomatic patients)  On your virtual visit today while we discussed that you have noted improvement in many of her symptoms you are still reporting feeling generalized weakness and tired and still decreased appetite  We confirm no significant coughing  Diarrhea resolved as of April 17th  You report every once in a while a minimal cough but no significant  Oxygen level is normal at 99%  You do not have a thermometer but you admit you still sometimes feels sweaty at night  We did discuss that this is a significant virus and can take a significant amount of time for full recovery  While you may get over the initial symptoms and be no longer contagious it can take much longer to get your energy back  We did discuss that I understand it is difficult to eat when you do not have much of an appetite but your weakness will continue without nutrition as well  Encourage you to eat small amounts throughout the day so you do not feel overwhelmed but start to get additional nutrition which will help in her recovery and energy  We also reviewed you plan to resume taking the supplements including vitamin-D and vitamin-C that were sent to her pharmacy to complete the full course  As per our discussion I am extending your time off of work through this week which will be a full 14 days with plan to return to work on Monday April 26th      Confirmed your email and letter will be scanned to that  We did review however that if you develop any new or worsening symptoms to contact our office  Also discussed with patient the importance of scheduling a routine appointment after he has recovered  He has a past medical history of hypertension without medications and as noted we did discuss abnormal kidney functions that will need to be revaluated once recovered  I have spent 15 minutes directly with the patient  Greater than 50% of this time was spent in counseling/coordination of care regarding: prognosis, risks and benefits of treatment options, instructions for management, importance of treatment compliance and impressions  Encounter provider Fara Bumpers, PA-C    Provider located at [address] [facility] [address]  [address] [facility],[address] [address]  [phone]    Recent Visits  Date Type Provider Dept   04/13/21 Telemedicine Ramila Szymanski, [address] [facility] recent visits within past 7 days and meeting all other requirements     Today's Visits  Date Type Provider Dept   04/20/21 Telemedicine Fara Bumpers, [facility] today's visits and meeting all other requirements     Future Appointments  No visits were found meeting these conditions  Showing future appointments within next 150 days and meeting all other requirements      This virtual check-in was done via [employer] and patient was informed that this is a secure, HIPAA-compliant platform  He agrees to proceed  Patient agrees to participate in a virtual check in via telephone or video visit instead of presenting to the office to address urgent/immediate medical needs  Patient is aware this is a billable service  After connecting through [facility], the patient was identified by name and date of birth   Ganga Contreras was informed that this was a telemedicine visit and that the exam was being conducted confidentially over secure lines  My office door was closed  No one else was in the room  Laisha Baxter acknowledged consent and understanding of privacy and security of the telemedicine visit  I informed the patient that I have reviewed his record in Epic and presented the opportunity for him to ask any questions regarding the visit today  The patient agreed to participate  Subjective:   Laisha Baxter is a 35 y o  male who has been screened for COVID-19  Symptom change since last report: improving  Patient's symptoms include chills, fatigue, cough, diarrhea (last 4/17) and myalgias (feels some at night)  Patient denies fever, sore throat, anosmia, loss of taste, shortness of breath, chest tightness, nausea and vomiting  Hung Bradshaw has been staying home and has isolated themselves in his home  He is taking care to not share personal items and is cleaning all surfaces that are touched often, like counters, tabletops, and doorknobs using household cleaning sprays or wipes  He is wearing a mask when he leaves his room  Date of symptom onset: 4/9/2021  Date of exposure: 4/6/2021  Date of positive COVID-19 PCR: 4/11/2021    Patient had initially presented to urgent care on April 6 reporting contact with co-worker positive for COVID  Had a very mild intermittent headache no additional symptoms at that time  That test was negative  Patient however then reported to the emergency room on April 11th with decreased appetite and generalized weakness  At that time patient was retested and positive for COVID-19  Chest x-ray negative for COVID pneumonia  Patient did have virtual follow-up with provider here on April 13th reporting ongoing symptoms  Labs from April 11th an ER does show elevated creatinine and a decreased EGFR  Also had similar with elevated creatinine of 1 56 in 2019 when diagnosed with pneumonia    No baseline for comparison  H/O SAHIL    On today's visit patient reporting still feeling weaker than usual, no thermometer but states getting sweaty at night  Decreased appetite  Has been drinking Gatorade, some water  Having some soup    Was coughing before now minimally  Last diarrhea 4/17/2021    Lives alone    O2 99% checked during visit    Patient states that he is concerned because he is supposed to go back to work tomorrow but he feels really weak and 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  Advised patient he might just need more time to recover but also does need to increase some food  Discussed does not need to eat a large meal but small amounts throughout the day  Patient had also stop taking the vitamins but encouraged him to complete the full course  Patient does admit that he feels better than he did but still more tired  Lab Results   Component Value Date    SARSCOV2 Positive (A) 04/11/2021     History reviewed  No pertinent past medical history  History reviewed  No pertinent surgical history    Current Outpatient Medications   Medication Sig Dispense Refill    acetaminophen (TYLENOL) 650 mg CR tablet Take 1 tablet (650 mg total) by mouth every 8 (eight) hours as needed for mild pain or moderate pain (headache) (Patient not taking: Reported on 4/20/2021) 30 tablet 1    Ascorbic Acid (vitamin C) 1000 MG tablet Take 1 tablet (1,000 mg total) by mouth 2 (two) times a day for 15 days (Patient not taking: Reported on 4/20/2021) 30 tablet 0    cholecalciferol (VITAMIN D3) 1,000 units tablet Take 2 tablets (2,000 Units total) by mouth daily (Patient not taking: Reported on 4/20/2021) 30 tablet 0    fluticasone (FLONASE) 50 mcg/act nasal spray 1 spray into each nostril daily (Patient not taking: Reported on 4/6/2021) 1 Bottle 0    Multiple Vitamin (multivitamin) tablet Take 1 tablet by mouth daily for 15 days (Patient not taking: Reported on 4/13/2021) 15 tablet 0    phenylephrine (SUDAFED PE) 10 MG TABS Take 1 tablet (10 mg total) by mouth every 6 (six) hours for 4 days (Patient not taking: Reported on 4/20/2021) 16 tablet 0     No current facility-administered medications for this visit  No Known Allergies    Review of Systems   Constitutional: Positive for chills and fatigue  Negative for fever  HENT: Negative for sore throat  Respiratory: Positive for cough  Negative for chest tightness and shortness of breath  Gastrointestinal: Positive for diarrhea (last 4/17)  Negative for nausea and vomiting  Musculoskeletal: Positive for myalgias (feels some at night)  Objective:    Vitals:    04/20/21 1051   SpO2: 99%       Physical Exam  Vitals signs reviewed  Constitutional:       General: He is not in acute distress  Appearance: He is not ill-appearing or diaphoretic  HENT:      Head: Normocephalic  Eyes:      Conjunctiva/sclera: Conjunctivae normal    Neck:      Musculoskeletal: Neck supple  Pulmonary:      Effort: Pulmonary effort is normal  No respiratory distress  Comments: Patient able to speak in full clear sentences no coughing no shortness of breath or difficulty breathing  Abdominal:      Tenderness: There is no abdominal tenderness  Skin:     Findings: No rash  Neurological:      Mental Status: He is alert  Mental status is at baseline  Psychiatric:         Mood and Affect: Mood normal        VIRTUAL VISIT DISCLAIMER    Aicha Jamil acknowledges that he has consented to an online visit or consultation  He understands that the online visit is based solely on information provided by him, and that, in the absence of a face-to-face physical evaluation by the physician, the diagnosis he receives is both limited and provisional in terms of accuracy and completeness  This is not intended to replace a full medical face-to-face evaluation by the physician  Aicha Jamil understands and accepts these terms

## 2021-04-30 ENCOUNTER — HOSPITAL ENCOUNTER (EMERGENCY)
Facility: HOSPITAL | Age: 41
Discharge: HOME | End: 2021-04-30
Attending: EMERGENCY MEDICINE | Admitting: EMERGENCY MEDICINE
Payer: COMMERCIAL

## 2021-04-30 ENCOUNTER — APPOINTMENT (EMERGENCY)
Dept: RADIOLOGY | Facility: HOSPITAL | Age: 41
End: 2021-04-30
Payer: COMMERCIAL

## 2021-04-30 VITALS
TEMPERATURE: 98 F | HEART RATE: 66 BPM | SYSTOLIC BLOOD PRESSURE: 145 MMHG | WEIGHT: 295 LBS | RESPIRATION RATE: 20 BRPM | DIASTOLIC BLOOD PRESSURE: 85 MMHG | OXYGEN SATURATION: 97 % | BODY MASS INDEX: 35.91 KG/M2

## 2021-04-30 DIAGNOSIS — R07.9 CHEST PAIN: Primary | ICD-10-CM

## 2021-04-30 DIAGNOSIS — M79.18 MUSCULOSKELETAL PAIN: ICD-10-CM

## 2021-04-30 DIAGNOSIS — R79.89 ELEVATED SERUM CREATININE: ICD-10-CM

## 2021-04-30 LAB
ANION GAP SERPL CALCULATED.3IONS-SCNC: 4 MMOL/L (ref 4–13)
BASOPHILS # BLD AUTO: 0.03 THOUSANDS/ΜL (ref 0–0.1)
BASOPHILS NFR BLD AUTO: 1 % (ref 0–1)
BUN SERPL-MCNC: 21 MG/DL (ref 5–25)
CALCIUM SERPL-MCNC: 8.7 MG/DL (ref 8.3–10.1)
CHLORIDE SERPL-SCNC: 110 MMOL/L (ref 100–108)
CO2 SERPL-SCNC: 27 MMOL/L (ref 21–32)
CREAT SERPL-MCNC: 1.61 MG/DL (ref 0.6–1.3)
EOSINOPHIL # BLD AUTO: 0.1 THOUSAND/ΜL (ref 0–0.61)
EOSINOPHIL NFR BLD AUTO: 2 % (ref 0–6)
ERYTHROCYTE [DISTWIDTH] IN BLOOD BY AUTOMATED COUNT: 14.6 % (ref 11.6–15.1)
GFR SERPL CREATININE-BSD FRML MDRD: 60 ML/MIN/1.73SQ M
GLUCOSE SERPL-MCNC: 88 MG/DL (ref 65–140)
HCT VFR BLD AUTO: 35.5 % (ref 36.5–49.3)
HGB BLD-MCNC: 12.1 G/DL (ref 12–17)
IMM GRANULOCYTES # BLD AUTO: 0.01 THOUSAND/UL (ref 0–0.2)
IMM GRANULOCYTES NFR BLD AUTO: 0 % (ref 0–2)
LYMPHOCYTES # BLD AUTO: 2.13 THOUSANDS/ΜL (ref 0.6–4.47)
LYMPHOCYTES NFR BLD AUTO: 44 % (ref 14–44)
MCH RBC QN AUTO: 25.9 PG (ref 26.8–34.3)
MCHC RBC AUTO-ENTMCNC: 34.1 G/DL (ref 31.4–37.4)
MCV RBC AUTO: 76 FL (ref 82–98)
MONOCYTES # BLD AUTO: 0.38 THOUSAND/ΜL (ref 0.17–1.22)
MONOCYTES NFR BLD AUTO: 8 % (ref 4–12)
NEUTROPHILS # BLD AUTO: 2.21 THOUSANDS/ΜL (ref 1.85–7.62)
NEUTS SEG NFR BLD AUTO: 45 % (ref 43–75)
NRBC BLD AUTO-RTO: 0 /100 WBCS
PLATELET # BLD AUTO: 286 THOUSANDS/UL (ref 149–390)
PMV BLD AUTO: 12.3 FL (ref 8.9–12.7)
POTASSIUM SERPL-SCNC: 3.4 MMOL/L (ref 3.5–5.3)
RBC # BLD AUTO: 4.67 MILLION/UL (ref 3.88–5.62)
SODIUM SERPL-SCNC: 141 MMOL/L (ref 136–145)
TROPONIN I SERPL-MCNC: 0.02 NG/ML
TROPONIN I SERPL-MCNC: <0.02 NG/ML
WBC # BLD AUTO: 4.86 THOUSAND/UL (ref 4.31–10.16)

## 2021-04-30 PROCEDURE — 93005 ELECTROCARDIOGRAM TRACING: CPT

## 2021-04-30 PROCEDURE — 80048 BASIC METABOLIC PNL TOTAL CA: CPT | Performed by: STUDENT IN AN ORGANIZED HEALTH CARE EDUCATION/TRAINING PROGRAM

## 2021-04-30 PROCEDURE — 71045 X-RAY EXAM CHEST 1 VIEW: CPT

## 2021-04-30 PROCEDURE — 84484 ASSAY OF TROPONIN QUANT: CPT | Performed by: STUDENT IN AN ORGANIZED HEALTH CARE EDUCATION/TRAINING PROGRAM

## 2021-04-30 PROCEDURE — 96374 THER/PROPH/DIAG INJ IV PUSH: CPT

## 2021-04-30 PROCEDURE — 99285 EMERGENCY DEPT VISIT HI MDM: CPT | Performed by: EMERGENCY MEDICINE

## 2021-04-30 PROCEDURE — 99285 EMERGENCY DEPT VISIT HI MDM: CPT

## 2021-04-30 PROCEDURE — 36415 COLL VENOUS BLD VENIPUNCTURE: CPT | Performed by: STUDENT IN AN ORGANIZED HEALTH CARE EDUCATION/TRAINING PROGRAM

## 2021-04-30 PROCEDURE — 85025 COMPLETE CBC W/AUTO DIFF WBC: CPT | Performed by: STUDENT IN AN ORGANIZED HEALTH CARE EDUCATION/TRAINING PROGRAM

## 2021-04-30 RX ORDER — KETOROLAC TROMETHAMINE 30 MG/ML
15 INJECTION, SOLUTION INTRAMUSCULAR; INTRAVENOUS ONCE
Status: COMPLETED | OUTPATIENT
Start: 2021-04-30 | End: 2021-04-30

## 2021-04-30 RX ORDER — ACETAMINOPHEN 325 MG/1
650 TABLET ORAL ONCE
Status: COMPLETED | OUTPATIENT
Start: 2021-04-30 | End: 2021-04-30

## 2021-04-30 RX ORDER — SODIUM CHLORIDE 9 MG/ML
3 INJECTION INTRAVENOUS
Status: DISCONTINUED | OUTPATIENT
Start: 2021-04-30 | End: 2021-05-01 | Stop reason: HOSPADM

## 2021-04-30 RX ADMIN — KETOROLAC TROMETHAMINE 15 MG: 30 INJECTION, SOLUTION INTRAMUSCULAR at 21:02

## 2021-04-30 RX ADMIN — ACETAMINOPHEN 650 MG: 325 TABLET, FILM COATED ORAL at 19:21

## 2021-04-30 NOTE — ED PROVIDER NOTES
History  Chief Complaint   Patient presents with    Chest Pain     Pt reports couple of days of chest pain, much worse today  Pain radiates to back into right shoulder and neck  Pt nausea,vomtting, SOB     Patient is a 49-year-old male, past medical history of hypertension and hyperlipidemia, who presents to the emergency department for right-sided chest tightness  Patient states the chest tightness started approximately around April 11th when he was diagnosed with COVID-19  Patient states since then, the pain has been intermittent  It will radiate to the right neck as well as to the back  The pain is somewhat worse when patient moves his head to the right  Relieving factors include Advil  There are no associated symptoms  Patient denies any prior history of pain like this in the past   He denies any known cardiac history  He denies any fever, chills, nausea, vomiting, diarrhea, shortness of breath, abdominal pain, or any other new or worsening symptoms  Patient is unsure of his family cardiac history  History provided by:  Patient   used: No    Chest Pain  Associated symptoms: back pain    Associated symptoms: no abdominal pain, no cough, no dizziness, no dysphagia, no fever, no headache, no nausea, no shortness of breath and not vomiting        Prior to Admission Medications   Prescriptions Last Dose Informant Patient Reported? Taking?    Ascorbic Acid (vitamin C) 1000 MG tablet   No Yes   Sig: Take 1 tablet (1,000 mg total) by mouth 2 (two) times a day for 15 days   Multiple Vitamin (multivitamin) tablet   No Yes   Sig: Take 1 tablet by mouth daily for 15 days   acetaminophen (TYLENOL) 650 mg CR tablet   No Yes   Sig: Take 1 tablet (650 mg total) by mouth every 8 (eight) hours as needed for mild pain or moderate pain (headache)   cholecalciferol (VITAMIN D3) 1,000 units tablet   No Yes   Sig: Take 2 tablets (2,000 Units total) by mouth daily   fluticasone (FLONASE) 50 mcg/act nasal spray Not Taking at Unknown time  No No   Si spray into each nostril daily   Patient not taking: Reported on 2021   phenylephrine (SUDAFED PE) 10 MG TABS   No No   Sig: Take 1 tablet (10 mg total) by mouth every 6 (six) hours for 4 days   Patient not taking: Reported on 2021      Facility-Administered Medications: None       History reviewed  No pertinent past medical history  History reviewed  No pertinent surgical history  Family History   Problem Relation Age of Onset    Diabetes Mother     Hypertension Mother     Stroke Mother     Hyperlipidemia Sister      I have reviewed and agree with the history as documented  E-Cigarette/Vaping    E-Cigarette Use Never User      E-Cigarette/Vaping Substances    Nicotine No     THC No     CBD No     Flavoring No     Other No     Unknown No      Social History     Tobacco Use    Smoking status: Never Smoker    Smokeless tobacco: Never Used   Substance Use Topics    Alcohol use: Not Currently     Comment: Social    Drug use: Yes     Types: Marijuana     Comment: last used: 21        Review of Systems   Constitutional: Negative for chills and fever  HENT: Negative for sore throat, trouble swallowing and voice change  Eyes: Negative for photophobia and visual disturbance  Respiratory: Negative for cough and shortness of breath  Cardiovascular: Positive for chest pain  Negative for leg swelling  Gastrointestinal: Negative for abdominal pain, diarrhea, nausea and vomiting  Musculoskeletal: Positive for back pain and neck pain  Negative for neck stiffness  Skin: Negative for rash and wound  Neurological: Negative for dizziness and headaches  Psychiatric/Behavioral: Negative for agitation and confusion  All other systems reviewed and are negative        Physical Exam  ED Triage Vitals   Temperature Pulse Respirations Blood Pressure SpO2   21 1842 21 1835 21 1835 21 1835 21 1835   98 °F (36 7 °C) 81 20 153/76 99 %      Temp Source Heart Rate Source Patient Position - Orthostatic VS BP Location FiO2 (%)   04/30/21 1842 04/30/21 1835 04/30/21 1835 04/30/21 1835 --   Oral Monitor Lying Right arm       Pain Score       04/30/21 1835       9             Orthostatic Vital Signs  Vitals:    04/30/21 1835 04/30/21 1930 04/30/21 2102   BP: 153/76 156/92 145/85   Pulse: 81 72 66   Patient Position - Orthostatic VS: Lying  Lying       Physical Exam  Vitals signs and nursing note reviewed  Constitutional:       General: He is not in acute distress  Appearance: He is well-developed  He is not diaphoretic  HENT:      Head: Normocephalic and atraumatic  Right Ear: External ear normal       Left Ear: External ear normal       Nose: Nose normal    Eyes:      General: Lids are normal  No scleral icterus  Neck:      Musculoskeletal: Normal range of motion and neck supple  Cardiovascular:      Rate and Rhythm: Normal rate and regular rhythm  Pulses:           Radial pulses are 2+ on the right side and 2+ on the left side  Dorsalis pedis pulses are 2+ on the right side and 2+ on the left side  Posterior tibial pulses are 2+ on the right side and 2+ on the left side  Heart sounds: Normal heart sounds  No murmur  No friction rub  No gallop  Pulmonary:      Effort: Pulmonary effort is normal  No respiratory distress  Breath sounds: Normal breath sounds  No wheezing or rales  Abdominal:      Palpations: Abdomen is soft  Tenderness: There is no abdominal tenderness  There is no guarding or rebound  Musculoskeletal: Normal range of motion  General: No deformity  Right lower leg: No edema  Left lower leg: No edema  Comments: There is tenderness to palpation over the R trapezius and right scalenes muscle  FROM of neck  Skin:     General: Skin is warm and dry  Capillary Refill: Capillary refill takes less than 2 seconds     Neurological: General: No focal deficit present  Mental Status: He is alert and oriented to person, place, and time  GCS: GCS eye subscore is 4  GCS verbal subscore is 5  GCS motor subscore is 6  Cranial Nerves: No dysarthria or facial asymmetry     Psychiatric:         Mood and Affect: Mood normal          Behavior: Behavior normal          ED Medications  Medications   acetaminophen (TYLENOL) tablet 650 mg (650 mg Oral Given 4/30/21 1921)   ketorolac (TORADOL) injection 15 mg (15 mg Intravenous Given 4/30/21 2102)       Diagnostic Studies  Results Reviewed     Procedure Component Value Units Date/Time    Troponin I repeat in 3hrs [[id number]]  (Normal) Collected: 04/30/21 2229    Lab Status: Final result Specimen: Blood from Arm, Left Updated: 04/30/21 2254     Troponin I 0 02 ng/mL     Troponin I [[id number]]  (Normal) Collected: 04/30/21 1918    Lab Status: Final result Specimen: Blood from Arm, Left Updated: 04/30/21 1957     Troponin I <0 02 ng/mL     Basic metabolic panel [[id number]]  (Abnormal) Collected: 04/30/21 1918    Lab Status: Final result Specimen: Blood from Arm, Left Updated: 04/30/21 1950     Sodium 141 mmol/L      Potassium 3 4 mmol/L      Chloride 110 mmol/L      CO2 27 mmol/L      ANION GAP 4 mmol/L      BUN 21 mg/dL      Creatinine 1 61 mg/dL      Glucose 88 mg/dL      Calcium 8 7 mg/dL      eGFR 60 ml/min/1 73sq m     Narrative:      [place] guidelines for Chronic Kidney Disease (CKD):     Stage 1 with normal or high GFR (GFR > 90 mL/min/1 73 square meters)    Stage 2 Mild CKD (GFR = 60-89 mL/min/1 73 square meters)    Stage 3A Moderate CKD (GFR = 45-59 mL/min/1 73 square meters)    Stage 3B Moderate CKD (GFR = 30-44 mL/min/1 73 square meters)    Stage 4 Severe CKD (GFR = 15-29 mL/min/1 73 square meters)    Stage 5 End Stage CKD (GFR <15 mL/min/1 73 square meters)  Note: GFR calculation is accurate only with a steady state creatinine    CBC and differential [[id number]]  (Abnormal) Collected: 04/30/21 1918    Lab Status: Final result Specimen: Blood from Arm, Left Updated: 04/30/21 1943     WBC 4 86 Thousand/uL      RBC 4 67 Million/uL      Hemoglobin 12 1 g/dL      Hematocrit 35 5 %      MCV 76 fL      MCH 25 9 pg      MCHC 34 1 g/dL      RDW 14 6 %      MPV 12 3 fL      Platelets 404 Thousands/uL      nRBC 0 /100 WBCs      Neutrophils Relative 45 %      Immat GRANS % 0 %      Lymphocytes Relative 44 %      Monocytes Relative 8 %      Eosinophils Relative 2 %      Basophils Relative 1 %      Neutrophils Absolute 2 21 Thousands/µL      Immature Grans Absolute 0 01 Thousand/uL      Lymphocytes Absolute 2 13 Thousands/µL      Monocytes Absolute 0 38 Thousand/µL      Eosinophils Absolute 0 10 Thousand/µL      Basophils Absolute 0 03 Thousands/µL                  X-ray chest 1 view portable   Final Result by Declan Herring MD (05/01 9633)      No acute cardiopulmonary disease                    Workstation performed: [id number]               Procedures  ECG 12 Lead Documentation Only    Date/Time: 4/30/2021 7:00 PM  Performed by: Carey Michele DO  Authorized by: Carey Michele DO     ECG reviewed by me, the ED Provider: yes    Patient location:  ED  Interpretation:     Interpretation: normal    Rate:     ECG rate:  83    ECG rate assessment: normal    Rhythm:     Rhythm: sinus rhythm    Ectopy:     Ectopy: none    QRS:     QRS axis:  Normal  Conduction:     Conduction: normal    ST segments:     ST segments:  Normal  T waves:     T waves: normal            ED Course  ED Course as of May 02 1511   Fri Apr 30, 2021   1946 WBC: 4 86   1946 Hemoglobin: 12 1   1946 Platelet Count: 348   1956 Potassium(!): 3 4   1956 WBC: 4 86   1956 Creatinine(!): 1 61   1957 Troponin I: <0 02   2254 Troponin I: 0 02             HEART Risk Score      Most Recent Value   Heart Score Risk Calculator   History  1 Filed at: 04/30/2021 1958   ECG  0 Filed at: 04/30/2021 1958   Age  0 Filed at: 04/30/2021 1958   Risk Factors  2 Filed at: 04/30/2021 1958   Troponin  0 Filed at: 04/30/2021 1958   HEART Score  3 Filed at: 04/30/2021 1958              PERC Rule for PE      Most Recent Value   PERC Rule for PE   Age >=50  0 Filed at: 04/30/2021 1850   HR >=100  0 Filed at: 04/30/2021 1850   O2 Sat on room air < 95%  0 Filed at: 04/30/2021 1850   History of PE or DVT  0 Filed at: 04/30/2021 1850   Recent trauma or surgery  0 Filed at: 04/30/2021 1850   Hemoptysis  0 Filed at: 04/30/2021 1850   Exogenous estrogen  0 Filed at: 04/30/2021 1850   Unilateral leg swelling  0 Filed at: 04/30/2021 1850   PERC Rule for PE Results  0 Filed at: 04/30/2021 1850                  Wells' Criteria for PE      Most Recent Value   Wells' Criteria for PE   Clinical signs and symptoms of DVT  0 Filed at: 04/30/2021 7989   PE is primary diagnosis or equally likely  0 Filed at: 04/30/2021 1957   HR >100  0 Filed at: 04/30/2021 1957   Immobilization at least 3 days or Surgery in the previous 4 weeks  0 Filed at: 04/30/2021 1957   Previous, objectively diagnosed PE or DVT  0 Filed at: 04/30/2021 1957   Hemoptysis  0 Filed at: 04/30/2021 1957   Malignancy with treatment within 6 months or palliative  0 Filed at: 04/30/2021 Diana Ragland' Criteria Total  0 Filed at: 04/30/2021 1957            MDM  Number of Diagnoses or Management Options  Chest pain:   Elevated serum creatinine:   Musculoskeletal pain:   Diagnosis management comments: Patient is a 35 y o  male who presented to the ED for chest pain  In the ED, patient was not tachycardic, hypertensive, not tachypneic and afebrile  Physical exam was unremarkable  Differential includes but is not limited to:  ACS, angina, msk pain, doubt PE as patient has no risk factors, doubt pneumothorax as patient has no shortness of breath or hypoxia, doubt aortic dissection as there are no pulse or pressure deficits as well as numbness or weakness in either extremity      Plan: Cardiac workup w/ delta trop    ED interventions: Tylenol, toradol    Findings: Elevated creatinine (likely due to muscular body habitus, previous elevations noted)  Negative delta trop  Reassessment: Patient was re-evaluated  Resting comfortably  Chest pain and neck pain improved     Disposition: Discharge  Likely cause of patient's pain msk in nature  Return precautions discussed  Recommended patient follow up with their primary care provider  Patient verbalized understanding and agreed to plan of care  Portions of the record may have been created with voice recognition software  Occasional wrong word or "sound a like" substitutions may have occurred due to the inherent limitations of voice recognition software  Read the chart carefully and recognize, using context, where substitutions have occurred  Amount and/or Complexity of Data Reviewed  Clinical lab tests: reviewed and ordered  Tests in the radiology section of CPT®: ordered and reviewed  Tests in the medicine section of CPT®: ordered and reviewed  Independent visualization of images, tracings, or specimens: yes    Risk of Complications, Morbidity, and/or Mortality  Presenting problems: moderate  Diagnostic procedures: moderate  Management options: moderate    Patient Progress  Patient progress: stable      Disposition  Final diagnoses:   Chest pain   Elevated serum creatinine   Musculoskeletal pain     Time reflects when diagnosis was documented in both MDM as applicable and the Disposition within this note     Time User Action Codes Description Comment    4/30/2021 10:55 PM Cherie  Add [R07 9] Chest pain     4/30/2021 10:58 PM Cherie  Add [R79 89] Elevated serum creatinine     4/30/2021 11:02 PM Cherie  Add [M79 18] Musculoskeletal pain       ED Disposition     ED Disposition Condition Date/Time Comment    Discharge Stable Fri Apr 30, 2021 10:55 PM Aicha Jamil discharge to home/self care              Follow-up Information Follow up With Specialties Details Why [address] [facility] Emergency Department Emergency Medicine  As needed [address]  [address] Emergency Department, [address], [facility], [address] [facility], [place] [address]   [address] [facility]  [address], [address], [facility], [address] [facility], [place]    [address] [facility]   [address] [facility] [place] [address] [facility] [place], [address] [place], [facility], [address] [facility], [place] [address]          Discharge Medication List as of 4/30/2021 11:03 PM      CONTINUE these medications which have NOT CHANGED    Details   acetaminophen (TYLENOL) 650 mg CR tablet Take 1 tablet (650 mg total) by mouth every 8 (eight) hours as needed for mild pain or moderate pain (headache), Starting Tue 4/13/2021, Normal      Ascorbic Acid (vitamin C) 1000 MG tablet Take 1 tablet (1,000 mg total) by mouth 2 (two) times a day for 15 days, Starting Sun 4/11/2021, Until Fri 4/30/2021, Print      cholecalciferol (VITAMIN D3) 1,000 units tablet Take 2 tablets (2,000 Units total) by mouth daily, Starting Sun 4/11/2021, Print      Multiple Vitamin (multivitamin) tablet Take 1 tablet by mouth daily for 15 days, Starting Sun 4/11/2021, Until Fri 4/30/2021, Print      fluticasone (FLONASE) 50 mcg/act nasal spray 1 spray into each nostril daily, Starting Thu 10/10/2019, Normal      phenylephrine (SUDAFED PE) 10 MG TABS Take 1 tablet (10 mg total) by mouth every 6 (six) hours for 4 days, Starting Thu 10/10/2019, Until Mon 10/14/2019, Normal           No discharge procedures on file      PDMP Review     None           ED Provider  Attending physically available and evaluated Laisha Baxter  TASNEEM managed the patient along with the ED Attending      Electronically Signed by         Gertrude Holter, DO  05/02/21 2031

## 2021-05-01 LAB
ATRIAL RATE: 83 BPM
P AXIS: 72 DEGREES
PR INTERVAL: 142 MS
QRS AXIS: 61 DEGREES
QRSD INTERVAL: 88 MS
QT INTERVAL: 350 MS
QTC INTERVAL: 411 MS
T WAVE AXIS: 40 DEGREES
VENTRICULAR RATE: 83 BPM

## 2021-05-01 PROCEDURE — 93010 ELECTROCARDIOGRAM REPORT: CPT | Performed by: INTERNAL MEDICINE

## 2021-05-01 NOTE — ED ATTENDING ATTESTATION
4/30/2021  IIain MD, saw and evaluated the patient  I have discussed the patient with the resident and agree with the resident's findings, Plan of Care, and MDM as documented in the resident's note, unless otherwise documented below  All available laboratory and imaging studies were reviewed by myself  I was present for key portions of any procedure(s) performed by the resident and I was immediately available to provide assistance  I agree with the current assessment done in the Emergency Department  I have conducted an independent evaluation of this patient  Aicha Jamil is a 35 y o  male with past medical history of hypertension, hyperlipidemia, recent COVID-19 infection diagnosed on 04/11/2021, presenting with right-sided chest pains with radiation to right neck as well as to back  Symptoms started after diagnosis with Covid-19, however, worsened when patient returned to work on Monday, 4 days ago  Pain radiates between right chest, right shoulder, right neck, and right back  It is worsened with movement and turning head  Patient has taken Advil with improvement in symptoms, however, pain returns  No shortness of breath  No nausea, vomiting, diarrhea, abdominal pain, or fevers  FHx positive for CVA (mother)  Patient is not on any medications daily      ROS:  Constitutional: denies fevers, chills  Visual/Eyes: no changes in vision  HENT: no rhinorrhea, no sore throat  Cardiac: Chest pain, as above, no lower extremity edema  Respiratory: no shortness of breath, no cough  GI: no abdominal pain, nausea, vomiting, or diarrhea  Heme/Onc: no easy bruising  Endocrine: no diabetes  Neuro: no weakness or numbness, no headaches    Ten systems reviewed and negative unless otherwise noted in HPI and above      Physical Exam  Vitals:    04/30/21 1835 04/30/21 1842 04/30/21 1930 04/30/21 2102   BP: 153/76  156/92 145/85   TempSrc:  Oral     Pulse: 81  72 66   Resp: 20  20 20   Patient Position - Orthostatic VS: Lying   Lying   Temp:  98 °F (36 7 °C)       SPO2 RA Rest      ED from 4/30/2021 in [address] [place] [address] Emergency Department   SpO2  97 %   SpO2 Activity  At Rest   O2 Device  None (Room air)   O2 Flow Rate  --          Constitutional:  Awake, alert, oriented  No acute distress  HEENT:  Normocephalic, atraumatic  Sclera anicteric, conjunctiva not injected  Moist oral mucosa  Cardiac:  Regular rate and rhythm, no murmurs, rubs, or gallops  2+ radial pulses and symmetric  There is tenderness with palpation along right pectoralis muscle group, right trapezius muscle group, and right rhomboid and lat dorsi muscles  No significant spasm  No crepitus or bony instability  Respiratory:  Lungs are clear to auscultation bilaterally, no wheezes or rales  Abdomen:  Nondistended  Bowel sounds present  No tenderness to palpation  No rebound or guarding  MSK:  No deformities, no edema  No shoulder asymmetry  Movement of right shoulder makes pain worse  Looking right and left makes neck pain worse  No appreciable muscle spasm  Integument:  No rashes over exposed areas, cap refill less than 2 seconds  Neurologic:  Awake, alert, and oriented x3    Nonfocal exam   Psychiatric:  Normal affect        Diagnostic Studies  Labs Reviewed   CBC AND DIFFERENTIAL - Abnormal       Result Value Ref Range Status    WBC 4 86  4 31 - 10 16 Thousand/uL Final    RBC 4 67  3 88 - 5 62 Million/uL Final    Hemoglobin 12 1  12 0 - 17 0 g/dL Final    Hematocrit 35 5 (*) 36 5 - 49 3 % Final    MCV 76 (*) 82 - 98 fL Final    MCH 25 9 (*) 26 8 - 34 3 pg Final    MCHC 34 1  31 4 - 37 4 g/dL Final    RDW 14 6  11 6 - 15 1 % Final    MPV 12 3  8 9 - 12 7 fL Final    Platelets 060  448 - 390 Thousands/uL Final    nRBC 0  /100 WBCs Final    Neutrophils Relative 45  43 - 75 % Final    Immat GRANS % 0  0 - 2 % Final    Lymphocytes Relative 44  14 - 44 % Final    Monocytes Relative 8  4 - 12 % Final    Eosinophils Relative 2  0 - 6 % Final    Basophils Relative 1  0 - 1 % Final    Neutrophils Absolute 2 21  1 85 - 7 62 Thousands/µL Final    Immature Grans Absolute 0 01  0 00 - 0 20 Thousand/uL Final    Lymphocytes Absolute 2 13  0 60 - 4 47 Thousands/µL Final    Monocytes Absolute 0 38  0 17 - 1 22 Thousand/µL Final    Eosinophils Absolute 0 10  0 00 - 0 61 Thousand/µL Final    Basophils Absolute 0 03  0 00 - 0 10 Thousands/µL Final   BASIC METABOLIC PANEL - Abnormal    Sodium 141  136 - 145 mmol/L Final    Potassium 3 4 (*) 3 5 - 5 3 mmol/L Final    Chloride 110 (*) 100 - 108 mmol/L Final    CO2 27  21 - 32 mmol/L Final    ANION GAP 4  4 - 13 mmol/L Final    BUN 21  5 - 25 mg/dL Final    Creatinine 1 61 (*) 0 60 - 1 30 mg/dL Final    Comment: Standardized to IDMS reference method    Glucose 88  65 - 140 mg/dL Final    Comment: If the patient is fasting, the ADA then defines impaired fasting glucose as > 100 mg/dL and diabetes as > or equal to 123 mg/dL  Specimen collection should occur prior to Sulfasalazine administration due to the potential for falsely depressed results  Specimen collection should occur prior to Sulfapyridine administration due to the potential for falsely elevated results      Calcium 8 7  8 3 - 10 1 mg/dL Final    eGFR 60  ml/min/1 73sq m Final    Narrative:     [place] guidelines for Chronic Kidney Disease (CKD):     Stage 1 with normal or high GFR (GFR > 90 mL/min/1 73 square meters)    Stage 2 Mild CKD (GFR = 60-89 mL/min/1 73 square meters)    Stage 3A Moderate CKD (GFR = 45-59 mL/min/1 73 square meters)    Stage 3B Moderate CKD (GFR = 30-44 mL/min/1 73 square meters)    Stage 4 Severe CKD (GFR = 15-29 mL/min/1 73 square meters)    Stage 5 End Stage CKD (GFR <15 mL/min/1 73 square meters)  Note: GFR calculation is accurate only with a steady state creatinine   TROPONIN I - Normal    Troponin I <0 02  <=0 04 ng/mL Final    Comment: Siemens Chemistry analyzer 99% cutoff is > 0 04 ng/mL in network labs     o cTnI 99% cutoff is useful only when applied to patients in the clinical setting of myocardial ischemia   o cTnI 99% cutoff should be interpreted in the context of clinical history, ECG findings and possibly cardiac imaging to establish correct diagnosis  o cTnI 99% cutoff may be suggestive but clearly not indicative of a coronary event without the clinical setting of myocardial ischemia  TROPONIN I - Normal    Troponin I 0 02  <=0 04 ng/mL Final    Comment: Siemens Chemistry analyzer 99% cutoff is > 0 04 ng/mL in network labs     o cTnI 99% cutoff is useful only when applied to patients in the clinical setting of myocardial ischemia   o cTnI 99% cutoff should be interpreted in the context of clinical history, ECG findings and possibly cardiac imaging to establish correct diagnosis  o cTnI 99% cutoff may be suggestive but clearly not indicative of a coronary event without the clinical setting of myocardial ischemia  X-ray chest 1 view portable   Final Result      No acute cardiopulmonary disease  Workstation performed: [id number]             Procedures  ECG 12 Lead Documentation Only    Date/Time: 4/30/2021 7:00 PM  Performed by: Chichi Thapa MD  Authorized by: Chichi Thapa MD     Comments:      Normal sinus rhythm, VR 83, , QRS 88, QTc 411, normal axis, No ST/T wave changes to suggest ischemia, no STEMI, no significant change from prior EKG dated 4/11/2021             HEART Risk Score      Most Recent Value   Heart Score Risk Calculator   History  1 Filed at: 04/30/2021 1958   ECG  0 Filed at: 04/30/2021 1958   Age  0 Filed at: 04/30/2021 1958   Risk Factors  2 Filed at: 04/30/2021 1958   Troponin  0 Filed at: 04/30/2021 1958   HEART Score  3 Filed at: 04/30/2021 1958          Wells' Criteria for PE      Most Recent Value   Wells' Criteria for PE   Clinical signs and symptoms of DVT  0 Filed at: 04/30/2021 7437   PE is primary diagnosis or equally likely  0 Filed at: 04/30/2021 1957   HR >100  0 Filed at: 04/30/2021 1957   Immobilization at least 3 days or Surgery in the previous 4 weeks  0 Filed at: 04/30/2021 1957   Previous, objectively diagnosed PE or DVT  0 Filed at: 04/30/2021 1957   Hemoptysis  0 Filed at: 04/30/2021 1957   Malignancy with treatment within 6 months or palliative  0 Filed at: 04/30/2021 Duy Rodriguezo' Criteria Total  0 Filed at: 04/30/2021 7439          ED Course  Medications   acetaminophen (TYLENOL) tablet 650 mg (650 mg Oral Given 4/30/21 1921)   ketorolac (TORADOL) injection 15 mg (15 mg Intravenous Given 4/30/21 242)     35year old male with a recent Covid-19 viral infection presenting with chest, shoulder, neck and back pain on the right made worse with movement  VS reviewed, afebrile, mildly hypertensive, not tachycardic or tachypneic or hypoxic  EKG obtained, as reviewed by me, as above, no ischemic changes  DDx includes musculoskeletal pain, myositis, costochondritis, pleuritic chest pain, ACS, PE, PNA, PNX, versus another pathology  Doubt dissection  Labs reveal BMP with Cr 1 61 (mildly elevated compared to 1 33 previously; given that patient has muscle bulk, Cr 1 3 - 1 6 could be WNL for the patient), mild hypokalemia, normal troponin  CBC is without leukocytosis or anemia  CXR to my review is without cardiomediastinal widening, infiltrates, pneumothorax or another acute abnormality  Tylenol and Toradol administered for pain  Delta troponin is negative  [place] PE criteria places patient at a low risk for PE, no indication for further PE evaluation at this time  Patient deemed safe to discharge to home with instructions for Tylenol/Ibuprofen for pain, follow up with PCP and with cardiology (given history of HTN/HLD, given FHx of CVA in mother)  Patient discharged to home with recommendations for symptom control, return precautions, and plan for follow up         Clinical Impression  Final diagnoses:   Chest pain   Elevated serum creatinine   Musculoskeletal pain       Discharge Medication List as of 4/30/2021 11:03 PM      CONTINUE these medications which have NOT CHANGED    Details   acetaminophen (TYLENOL) 650 mg CR tablet Take 1 tablet (650 mg total) by mouth every 8 (eight) hours as needed for mild pain or moderate pain (headache), Starting Tue 4/13/2021, Normal      Ascorbic Acid (vitamin C) 1000 MG tablet Take 1 tablet (1,000 mg total) by mouth 2 (two) times a day for 15 days, Starting Sun 4/11/2021, Until Fri 4/30/2021, Print      cholecalciferol (VITAMIN D3) 1,000 units tablet Take 2 tablets (2,000 Units total) by mouth daily, Starting Sun 4/11/2021, Print      Multiple Vitamin (multivitamin) tablet Take 1 tablet by mouth daily for 15 days, Starting Sun 4/11/2021, Until Fri 4/30/2021, Print      fluticasone (FLONASE) 50 mcg/act nasal spray 1 spray into each nostril daily, Starting Thu 10/10/2019, Normal      phenylephrine (SUDAFED PE) 10 MG TABS Take 1 tablet (10 mg total) by mouth every 6 (six) hours for 4 days, Starting Thu 10/10/2019, Until Mon 10/14/2019, Normal

## 2021-05-01 NOTE — DISCHARGE INSTRUCTIONS
You were evaluated in the Emergency Department today for chest pain  Your evaluation has shown no signs of medical conditions requiring emergent intervention at this time, however we recommend that you follow up with your primary care physician or your cardiologist as soon as possible for further testing as an outpatient  Please schedule an appointment for follow up with your primary care physician as soon as possible  For pain, you may use tylenol and/or ibuprofen as needed  Return to the Emergency Department if you experience worsening or uncontrolled chest pain, shortness of breath, light headedness, feeling faint, nausea, vomiting, or any other concerning symptoms  Thank you for choosing us for your care

## 2021-05-11 ENCOUNTER — TELEPHONE (OUTPATIENT)
Dept: PSYCHIATRY | Facility: CLINIC | Age: 41
End: 2021-05-11

## 2021-05-11 NOTE — TELEPHONE ENCOUNTER
Patient called back in March looking to set up an apt with a doctor can you please give him a call thank you

## 2021-05-11 NOTE — TELEPHONE ENCOUNTER
Behavorial Health Outpatient Intake Questions    Referred by: PCP    Please advised interviewee that they need to answer all questions truthfully to allow for best care and any misrepresentations of information may affect their ability to be seen at this clinic   => Was this discussed? Yes     [facility] -     Presenting Problem (in patient's words): Patient reports he is not sure what he needs treatment  Stress may be a factor, and he is arguing with his wife  Are there any developmental disabilities? ? If yes, can they speak to you on the phone? If they are too limited to speak to you on phone, refer out No    Are you taking any psychiatric medications? No    => If yes, who prescribes? If yes, are they injectable medications? Does the patient have a language barrier or hearing impairment? No    Have you been treated at [facility] by a therapist or a doctor in the past? If yes, who? No    Has the patient been hospitalized for mental health? No   If yes, how long ago was last hospitalization and where was it? Do you actively use alcohol or marijuana or illegal substances? If yes, what and how much - refer out to Drug and alcohol treatment if use is excessive or daily use of illegal substances No concerns of substance abuse are reported  Do you have a community treatment team or ? No    Legal History-     Does the patient have any history of arrests, FPC/assisted time, or DUIs? No  If Yes-  1) What types of charges? 2) When were they last incarcerated? 3) Are they currently on parole or probation? Minor Child-    Who has custody of the child? Is there a custody agreement? If there is a custody agreement remind parent that they must bring a copy to the first appt or they will not be seen       Intake Team, please check with provider before scheduling if flags come up such as:  - complex case  - legal history (other than DUI)  - communication barrier concerns are present  - if, in your judgment, this needs further review    ACCEPTED as a patient Yes  => Appointment Date: 9/20/21 at 9:00 am with Dr Suzette Fitzpatrick? No    Name of Insurance Co:   Insurance ID#  [employer] #  If ins is primary or secondary  If patient is a minor, parents information such as Name, D  O B of guarantor

## 2021-06-19 ENCOUNTER — IMMUNIZATIONS (OUTPATIENT)
Dept: FAMILY MEDICINE CLINIC | Facility: HOSPITAL | Age: 41
End: 2021-06-19

## 2021-06-19 DIAGNOSIS — Z23 ENCOUNTER FOR IMMUNIZATION: Primary | ICD-10-CM

## 2021-06-19 PROCEDURE — 0001A SARS-COV-2 / COVID-19 MRNA VACCINE (PFIZER-BIONTECH) 30 MCG: CPT

## 2021-06-19 PROCEDURE — 91300 SARS-COV-2 / COVID-19 MRNA VACCINE (PFIZER-BIONTECH) 30 MCG: CPT

## 2021-06-29 ENCOUNTER — APPOINTMENT (OUTPATIENT)
Dept: LAB | Facility: CLINIC | Age: 41
End: 2021-06-29
Payer: COMMERCIAL

## 2021-06-29 ENCOUNTER — OFFICE VISIT (OUTPATIENT)
Dept: INTERNAL MEDICINE CLINIC | Facility: CLINIC | Age: 41
End: 2021-06-29

## 2021-06-29 VITALS
SYSTOLIC BLOOD PRESSURE: 143 MMHG | TEMPERATURE: 97.8 F | DIASTOLIC BLOOD PRESSURE: 90 MMHG | WEIGHT: 289 LBS | HEART RATE: 86 BPM | HEIGHT: 76 IN | BODY MASS INDEX: 35.19 KG/M2

## 2021-06-29 DIAGNOSIS — Z11.4 SCREENING FOR HIV (HUMAN IMMUNODEFICIENCY VIRUS): ICD-10-CM

## 2021-06-29 DIAGNOSIS — Z11.59 NEED FOR HEPATITIS C SCREENING TEST: ICD-10-CM

## 2021-06-29 DIAGNOSIS — R30.0 DYSURIA: ICD-10-CM

## 2021-06-29 DIAGNOSIS — R30.0 DYSURIA: Primary | ICD-10-CM

## 2021-06-29 LAB
HBV SURFACE AB SER-ACNC: <3.1 MIU/ML
HCV AB SER QL: NORMAL

## 2021-06-29 PROCEDURE — 3008F BODY MASS INDEX DOCD: CPT | Performed by: INTERNAL MEDICINE

## 2021-06-29 PROCEDURE — 87389 HIV-1 AG W/HIV-1&-2 AB AG IA: CPT

## 2021-06-29 PROCEDURE — 36415 COLL VENOUS BLD VENIPUNCTURE: CPT

## 2021-06-29 PROCEDURE — 1036F TOBACCO NON-USER: CPT | Performed by: INTERNAL MEDICINE

## 2021-06-29 PROCEDURE — 86803 HEPATITIS C AB TEST: CPT

## 2021-06-29 PROCEDURE — 99213 OFFICE O/P EST LOW 20 MIN: CPT | Performed by: INTERNAL MEDICINE

## 2021-06-29 PROCEDURE — 86706 HEP B SURFACE ANTIBODY: CPT

## 2021-06-29 PROCEDURE — 86592 SYPHILIS TEST NON-TREP QUAL: CPT

## 2021-06-29 RX ORDER — AZITHROMYCIN 250 MG/1
1000 TABLET, FILM COATED ORAL ONCE
Qty: 4 TABLET | Refills: 0 | Status: SHIPPED | OUTPATIENT
Start: 2021-06-29 | End: 2021-06-29

## 2021-06-29 NOTE — PROGRESS NOTES
[facility]  INTERNAL MEDICINE ACUTE VISIT     PATIENT INFORMATION     Tiny Karissa   35 y o  male   MRN: [id number]    ASSESSMENT/PLAN     Diagnoses and all orders for this visit:    Dysuria  One month history of worsening dysuria  Denies hematuria, increased frequency or urgency, or genital sores/lesions  Multiple new sexual partners  Concern for UTI vs STD  Given high likelihood of STD, will treat  With 1 g azithromycin x1  ·   Follow-up on lab work ordered  Pending results, will contact patient if further treatment is needed  ·   Counseled on safe sex practices  ·  discussed ED precautions, patient verbalized understanding   -     Chlamydia/GC amplified DNA by PCR; Future  -     RPR; Future  -     UA w Reflex to Microscopic w Reflex to Culture -Lab Collect  -     azithromycin (ZITHROMAX) 250 mg tablet; Take 4 tablets (1,000 mg total) by mouth once for 1 dose    Need for hepatitis C screening test  -     Hepatitis C Antibody ([facility]); Future    Screening for HIV (human immunodeficiency virus)  -     HIV 1/2 Antigen/Antibody (4th Generation) w Reflex [facility]; Future    CHIEF COMPLAINT     Chief Complaint   Patient presents with    Follow-up     burning while urinating     HISTORY OF PRESENT ILLNESS      78-year-old male with past medical history of SAHIL, hypertension, hyperlipidemia who presents to clinic today for same-day visit  It is about a month ago he noted worsening burning when he pees  Denies any blood in the urine or discharge from the penis  Denies any sores or lesions  States he had STD in 2009  Never had a UTI  Denies increased urgency or frequency  Denies fevers or chills or flank pain  Has had multiple sexual partners in the past month  Usually uses protection, but did have an  Incidence about a month ago or the condom broke  Unsure if the sexual partner had any STDs at that time  Denies any known medication allergies    All other review of systems negative at this time  REVIEW OF SYSTEMS     Review of Systems   Constitutional: Negative for activity change, appetite change, fatigue and fever  HENT: Negative for congestion, postnasal drip, rhinorrhea and sinus pain  Eyes: Negative for photophobia, pain and discharge  Respiratory: Negative for apnea, cough, chest tightness, shortness of breath and wheezing  Cardiovascular: Negative for chest pain and leg swelling  Gastrointestinal: Negative for abdominal distention, abdominal pain, constipation, diarrhea, nausea and vomiting  Endocrine: Negative for polyuria  Genitourinary: Positive for dysuria  Negative for decreased urine volume, difficulty urinating, discharge, genital sores, hematuria, penile pain, penile swelling, scrotal swelling, testicular pain and urgency  Musculoskeletal: Negative for back pain, myalgias and neck pain  Skin: Negative for pallor, rash and wound  Neurological: Negative for dizziness, tremors, weakness, light-headedness and headaches  Hematological: Does not bruise/bleed easily  Psychiatric/Behavioral: Negative for agitation, behavioral problems and suicidal ideas  The patient is not nervous/anxious and is not hyperactive  OBJECTIVE     Vitals:    06/29/21 1110   BP: 143/90   BP Location: Right arm   Patient Position: Sitting   Cuff Size: Large   Pulse: 86   Temp: 97 8 °F (36 6 °C)   TempSrc: Temporal   Weight: 131 kg (289 lb)   Height: 6' 4" (1 93 m)     Physical Exam  Vitals reviewed  Constitutional:       General: He is not in acute distress  Appearance: He is well-developed  He is not diaphoretic  HENT:      Head: Normocephalic and atraumatic  Mouth/Throat:      Pharynx: No oropharyngeal exudate  Eyes:      General: No scleral icterus  Extraocular Movements: Extraocular movements intact  Conjunctiva/sclera: Conjunctivae normal    Neck:      Vascular: No JVD  Trachea: No tracheal deviation     Cardiovascular:      Rate and Rhythm: Normal rate and regular rhythm  Heart sounds: No murmur heard  No friction rub  No gallop  Pulmonary:      Effort: Pulmonary effort is normal  No respiratory distress  Breath sounds: No stridor  No wheezing  Abdominal:      General: There is no distension  Palpations: Abdomen is soft  There is no mass  Tenderness: There is no abdominal tenderness  There is no right CVA tenderness or left CVA tenderness  Musculoskeletal:         General: No tenderness  Normal range of motion  Right lower leg: No edema  Left lower leg: No edema  Skin:     General: Skin is warm and dry  Coloration: Skin is not pale  Findings: No erythema  Neurological:      Mental Status: He is alert and oriented to person, place, and time  Psychiatric:         Behavior: Behavior normal          Thought Content:  Thought content normal        CURRENT MEDICATIONS     Current Outpatient Medications:     acetaminophen (TYLENOL) 650 mg CR tablet, Take 1 tablet (650 mg total) by mouth every 8 (eight) hours as needed for mild pain or moderate pain (headache) (Patient not taking: Reported on 6/29/2021), Disp: 30 tablet, Rfl: 1    Ascorbic Acid (vitamin C) 1000 MG tablet, Take 1 tablet (1,000 mg total) by mouth 2 (two) times a day for 15 days, Disp: 30 tablet, Rfl: 0    azithromycin (ZITHROMAX) 250 mg tablet, Take 4 tablets (1,000 mg total) by mouth once for 1 dose, Disp: 4 tablet, Rfl: 0    cholecalciferol (VITAMIN D3) 1,000 units tablet, Take 2 tablets (2,000 Units total) by mouth daily (Patient not taking: Reported on 6/29/2021), Disp: 30 tablet, Rfl: 0    fluticasone (FLONASE) 50 mcg/act nasal spray, 1 spray into each nostril daily (Patient not taking: Reported on 4/6/2021), Disp: 1 Bottle, Rfl: 0    Multiple Vitamin (multivitamin) tablet, Take 1 tablet by mouth daily for 15 days (Patient not taking: Reported on 6/29/2021), Disp: 15 tablet, Rfl: 0    phenylephrine (SUDAFED PE) 10 MG TABS, Take 1 tablet (10 mg total) by mouth every 6 (six) hours for 4 days (Patient not taking: Reported on 4/20/2021), Disp: 16 tablet, Rfl: 0    PAST MEDICAL & SURGICAL HISTORY   History reviewed  No pertinent past medical history  History reviewed  No pertinent surgical history  SOCIAL & FAMILY HISTORY     Social History     Socioeconomic History    Marital status: /Civil Union     Spouse name: Not on file    Number of children: Not on file    Years of education: Not on file    Highest education level: Not on file   Occupational History    Not on file   Tobacco Use    Smoking status: Never Smoker    Smokeless tobacco: Never Used   Vaping Use    Vaping Use: Never used   Substance and Sexual Activity    Alcohol use: Not Currently     Comment: Social    Drug use: Yes     Types: Marijuana     Comment: last used: 4/9/21    Sexual activity: Yes     Partners: Female   Other Topics Concern    Not on file   Social History Narrative    Caffeine use    Always uses seat belt          Social Determinants of Health     Financial Resource Strain:     Difficulty of Paying Living Expenses:    Food Insecurity:     Worried About Running Out of Food in the Last Year:     Ran Out of Food in the Last Year:    Transportation Needs:     Lack of Transportation (Medical):      Lack of Transportation (Non-Medical):    Physical Activity:     Days of Exercise per Week:     Minutes of Exercise per Session:    Stress:     Feeling of Stress :    Social Connections:     Frequency of Communication with Friends and Family:     Frequency of Social Gatherings with Friends and Family:     Attends Confucianist Services:     Active Member of Clubs or Organizations:     Attends Club or Organization Meetings:     Marital Status:    Intimate Partner Violence:     Fear of Current or Ex-Partner:     Emotionally Abused:     Physically Abused:     Sexually Abused:      Social History     Substance and Sexual Activity   Alcohol Use Not Currently    Comment: Social     Social History     Substance and Sexual Activity   Drug Use Yes    Types: Marijuana    Comment: last used: 4/9/21     Social History     Tobacco Use   Smoking Status Never Smoker   Smokeless Tobacco Never Used     Family History   Problem Relation Age of Onset    Diabetes Mother     Hypertension Mother     Stroke Mother     Hyperlipidemia Sister      ==  Zanedevon Worley DO  PGY2  [address] Internal [address]  [address] , [id number] [place] [address] 28, [address] [facility]  Office: [phone]  Fax: [phone]

## 2021-06-29 NOTE — PATIENT INSTRUCTIONS
Sexually Transmitted Diseases   WHAT YOU NEED TO KNOW:   A sexually transmitted disease (STD) means signs or symptoms of a sexually transmitted infection (STI) have developed  An STI happens when bacteria or a virus are spread through oral, genital, or anal sex  Some examples of STDs are HIV, chlamydia, syphilis, and gonorrhea  DISCHARGE INSTRUCTIONS:   Return to the emergency department if:   · You have genital swelling or pain, or unusual bleeding  · You have joint pain, a rash, swollen lymph nodes, or night sweats  · You have severe abdominal pain  Call your doctor if:   · You have a fever  · Your symptoms do not go away or get worse, even after treatment  · You have bleeding or pain during sex  · You or your female partner may be pregnant  · You have questions or concerns about your condition or care  Medicines: You may need any of the following:  · Antibiotics  may be given for a bacterial infection  · Antivirals  may be given for a viral infection  · Antifungals  may be given for a fungal infection, such as a yeast infection  · Take your medicine as directed  Contact your healthcare provider if you think your medicine is not helping or if you have side effects  Tell him of her if you are allergic to any medicine  Keep a list of the medicines, vitamins, and herbs you take  Include the amounts, and when and why you take them  Bring the list or the pill bottles to follow-up visits  Carry your medicine list with you in case of an emergency  Help prevent the spread of an STI:  Ask your healthcare provider for more information about the following safe sex practices:  · Use a male or female condom during sex  This includes oral, genital, or anal sex  Use a new condom each time  Condoms help prevent pregnancy and STIs  Use latex condoms, if possible  Lambskin (also called sheepskin or natural membrane) condoms do not protect against STIs   A polyurethane condom can be used if you or your partner is allergic to latex  Condoms should be used with a second form of birth control to help prevent pregnancy and STIs  Do not use male and female condoms together  · Do not have sex with someone who has an STI or STD  This includes oral and anal sex  · Limit sex partners  Ask about your partner's sex history before you have sex  · Get screened regularly if you are sexually active  Common tests include chlamydia, gonorrhea, HIV, hepatitis, and syphilis  · Tell your sex partners if you have an STI  Your partners may need to be tested and treated  Do not have sex while you are being treated for an STI  Do not have sex with a partner who is being treated  · Ask about medicines to lower your risk for some STIs:      ? Vaccines  can help protect you from hepatitis A, hepatitis B, and the human papillomavirus (HPV)  The HPV vaccine is usually given at 11 years, but it may be given through 26 years to both females and males  Your provider can give you more information on vaccines to prevent STIs  ? Pre-exposure prophylaxis (PrEP)  may be given if you are at high risk for HIV  PrEP is taken every day to prevent the virus from fully infecting the body  · If you are a woman, do not douche  Douching upsets the normal balance of bacteria found in your vagina  It does not prevent or clear up vaginal infections  Follow up with your doctor as directed: You may need more tests  If you have an STD, you may need immediate or ongoing treatment  Your doctor may also refer you to a specialist who can provide specific treatment  Write down your questions so you remember to ask them during your visits  © Copyright [address] Information is for End User's use only and may not be sold, redistributed or otherwise used for commercial purposes   All illustrations and images included in CareNotes® are the copyrighted property of [employer]  or [facility]   The above information is an  only  It is not intended as medical advice for individual conditions or treatments  Talk to your doctor, nurse or pharmacist before following any medical regimen to see if it is safe and effective for you

## 2021-06-30 LAB
HIV 1+2 AB+HIV1 P24 AG SERPL QL IA: NORMAL
RPR SER QL: NORMAL

## 2021-07-10 ENCOUNTER — IMMUNIZATIONS (OUTPATIENT)
Dept: FAMILY MEDICINE CLINIC | Facility: HOSPITAL | Age: 41
End: 2021-07-10

## 2021-07-10 DIAGNOSIS — Z23 ENCOUNTER FOR IMMUNIZATION: Primary | ICD-10-CM

## 2021-07-10 PROCEDURE — 91300 SARS-COV-2 / COVID-19 MRNA VACCINE (PFIZER-BIONTECH) 30 MCG: CPT

## 2021-07-10 PROCEDURE — 0002A SARS-COV-2 / COVID-19 MRNA VACCINE (PFIZER-BIONTECH) 30 MCG: CPT

## 2021-07-23 ENCOUNTER — TELEPHONE (OUTPATIENT)
Dept: PSYCHIATRY | Facility: CLINIC | Age: 41
End: 2021-07-23

## 2021-07-23 NOTE — TELEPHONE ENCOUNTER
Called pt to switch his NP appt from Monday 9-20 to 9-22 due to [address] switching resident days with [facility]  And per Marv's OK  Pt informed he that he would like to push his appt back to maybe November as Pt doesn't have insurance right now  He just started a new job and insurance is in the works and his previous insurance is ending  I explained to the Pt that I can't do that and will transfer to Intake to get this appt rescheduled  Pt said he wasn't able to do the rescheduling at the moment  I left Pt know that that was no problem at all and that I will have intake reach out to him to get his appt rescheduled to a later date  Pt can be reached at [phone]  I canceled his NP appt, 9-20-21, and the f/u appt, 10-20-21, off his appt desk

## 2022-03-30 ENCOUNTER — OFFICE VISIT (OUTPATIENT)
Dept: INTERNAL MEDICINE CLINIC | Facility: CLINIC | Age: 42
End: 2022-03-30

## 2022-03-30 VITALS
DIASTOLIC BLOOD PRESSURE: 95 MMHG | HEIGHT: 76 IN | BODY MASS INDEX: 36.77 KG/M2 | HEART RATE: 75 BPM | WEIGHT: 302 LBS | TEMPERATURE: 98.3 F | SYSTOLIC BLOOD PRESSURE: 159 MMHG

## 2022-03-30 DIAGNOSIS — I10 PRIMARY HYPERTENSION: ICD-10-CM

## 2022-03-30 DIAGNOSIS — Z86.59 HISTORY OF DEPRESSION: ICD-10-CM

## 2022-03-30 DIAGNOSIS — K65.1 LEFT LOWER QUADRANT ABDOMINAL ABSCESS (HCC): Primary | ICD-10-CM

## 2022-03-30 DIAGNOSIS — S31.809A WOUND OF GLUTEAL CLEFT, UNSPECIFIED LATERALITY, INITIAL ENCOUNTER: ICD-10-CM

## 2022-03-30 DIAGNOSIS — F12.90 USES MARIJUANA: ICD-10-CM

## 2022-03-30 DIAGNOSIS — L29.0 ANAL ITCHING: ICD-10-CM

## 2022-03-30 PROCEDURE — 99213 OFFICE O/P EST LOW 20 MIN: CPT | Performed by: INTERNAL MEDICINE

## 2022-03-30 RX ORDER — AMLODIPINE BESYLATE 10 MG/1
10 TABLET ORAL DAILY
Qty: 30 TABLET | Refills: 1 | Status: SHIPPED | OUTPATIENT
Start: 2022-03-30 | End: 2022-04-28

## 2022-03-30 RX ORDER — QUETIAPINE FUMARATE 50 MG/1
50 TABLET, FILM COATED ORAL
COMMUNITY
Start: 2022-03-20

## 2022-03-30 RX ORDER — DIVALPROEX SODIUM 500 MG/1
TABLET, DELAYED RELEASE ORAL
COMMUNITY
Start: 2022-03-20

## 2022-03-30 RX ORDER — DIAPER,BRIEF,INFANT-TODD,DISP
EACH MISCELLANEOUS 4 TIMES DAILY PRN
Qty: 30 G | Refills: 1 | Status: SHIPPED | OUTPATIENT
Start: 2022-03-30 | End: 2022-03-30

## 2022-03-30 RX ORDER — HYDROCORTISONE 25 MG/G
CREAM TOPICAL 2 TIMES DAILY
Qty: 28 G | Refills: 1 | Status: SHIPPED | OUTPATIENT
Start: 2022-03-30

## 2022-03-31 PROBLEM — N18.2 CKD (CHRONIC KIDNEY DISEASE) STAGE 2, GFR 60-89 ML/MIN: Status: ACTIVE | Noted: 2022-03-31

## 2022-03-31 NOTE — PROGRESS NOTES
INTERNAL MEDICINE OFFICE VISIT  [facility]  [address] [place] [address]    NAME: Lora Card  AGE: 38 y o  SEX: male    DATE OF ENCOUNTER: 3/30/2022    Assessment and Plan     1  Left lower quadrant abdominal abscess ([address] )    Given reported history - see HPI below - likely due to intestinal worm infection  Other possible etiologies include but not limited to IBD or Diverticulitis vs but less likely atypical appendicitis, constipation, kidney stone, psoas muscle abscess  Hgb WNL but lower normal range, 12's ! Could be related if proved parasitic infection or undiagnosed IBD  - Ova and parasite examination; Future  - Pinworm prep; Future  - Digital rectal exam attempted with chaperon in the room, but failed d/t patient not cooperative during exam, see HPI    - f/u in 5 weeks for revaluation and further managements; if above workup negative, will consider imaging, +/- referral to GI for potential need for endoscopy  2  Anal itching  3  Wound of gluteal cleft, unspecified laterality, initial encounter  The intergluteal cleft superficial cleft lesion , about 2 cm long more consistent with self-induced d/t itching and self-scratching/irritation  - see #1   - hydrocortisone (ANUSOL-HC) 2 5 % rectal cream; Apply topically 2 (two) times a day  Dispense: 28 g; Refill: 1      4  Primary hypertension  New Diagnosis; will revaluate next visit  - amLODIPine (NORVASC) 10 mg tablet; Take 1 tablet (10 mg total) by mouth daily  Dispense: 30 tablet; Refill: 1    5  Uses Marijuana    Smokes marijuana daily, advised on possible health complications including memory/intelectual deficit, psych/mood side effects, risk of lung cancer; however was brief discussion as 1st visit for me with the patient and will readdress as appropriate in futures visits       6  History of Depression:   Denies suicidal thoughts or ideation; anxiety and intermittent quick intervals of nervousness/agitation suspected during rooming and the visit; see HPI, on Seroquel and Depakote, will address further and revaluate in upcoming visits  Orders Placed This Encounter   Procedures    Ova and parasite examination    Pinworm prep         - Counseling Documentation: patient was counseled regarding: instructions for management, risk factor reductions and risks and benefits of treatment options    Chief Complaint     Chief Complaint   Patient presents with    Follow-up     left side abdomen pain, rash on back       History of Present Illness     Val Villegas is 39 yo Male w PMH remarkable for SAHIL, COVID infection, HLD, "psych hx" on Depakote and Seroquel presents in Same Day Visit for concern of worsening LLQ pain and "anal rash"  For documentation purposes, patient was unhappy during rooming process with the staff, mainly when he was asked "do you have any difficulty or need assistance for ensuring enough food, medication or housing" and he "shouted" saying "I am ,    " however calmed down and expressed understanding after the purpose of the screening social question explained to him by the staff  Patient noted the pain is chronic intermittent LLQ pain, for 2+ years, occurs every 1-2 days, got worse past week, described as pressure feeling, noted more after having bowel movements, but also "happens any other time"  Patient also noted having intermittent feeling of "itchness" deep inside his anus, feel it more after bowel movements as well  - Denies N/V/C/D; denies BRBPR  Hemorrhoids; denies weight loss, or fever       - Relevant Medical Hx: hx of intestinal worm "in teenager age when was back home in [place]", estimates its size to about 6-8 inches, does not recall any further details about diagnosis made or treatment given       -  Relevant Social Hx: moved from [place] to [facility] then to [address] about 6 years ago, works as , smokes Marijuana every day, does not smoke cigarettes, denies alcohol use or any recreational drugs, lives with wife who is his only sexual partner     - Relevant Fam Hx: denies any family hx of cancers or inflammatory bowel diseases  Mother has HTN and DM        - Need for KRISSY explained, patient agreed, chaperon in the room, walked the patient step by step verbally before and during exam, however on starting the exam patient got anxious, tensed up, and did not relax/cooperate enough to complete the test, mainly the digital insertion part saying "I am anti-ca and that is why I can not relax"; patient's desire and decision respected, and stopped the test  Intergluteal lesion noted, see P  Exam for details  - Discussed above A&P; patient agreeable, will have tests done and follow up in 5 weeks for further evaluation      - Elevated BP per chart review as well as this visit -150's, diagnosis explained and need for pharmacological management, explained possible complications, initially declined "I am not a person who likes taking medications" then agreed and Norvasc 10 mg sent in      - On checking out, patient requested to follow up with me and assign as his PCP, his request granted and f/u appointment scheduled  Will follow up on ordered tests and will arrange/order further tests/referral as appropriate  The following portions of the patient's history were reviewed and updated as appropriate: allergies, current medications, past family history, past medical history, past social history, past surgical history and problem list     Review of Systems   - GENERAL: Negative for any nausea, vomiting, fevers, chills, or weight loss  - HEENT: Negative for any head/Neck trauma, pain, double/blurry vision, sinusitis, rhinitis, nose bleeding   - CARDIAC: Negative for any chest pain, palpitation, Dyspnea on exertion, peripheral edema    - PULMONARY: Negative for any SOB, cough, wheezing    - GASTROINTESTINAL: + for chronic intermittent LLQ pain, anal itching;  Negative for any  N/V/D/C, blood in stool    - GENITOURINARY: Negative for any dysuria, hematuria, incontinence   - NEUROLOGIC: Negative for any muscle weakness, numbness/tingling, memory changes  - MUSCULOSKELETAL: Negative for any joint pains/swelling, limited ROM  - INTEGUMENTARY: + for anal and gluteal cleft itching; Negative for any rashes, cuts/ lesions   - HEMATOLOGIC: Negative for any abnormal bruising, frequent infections or bleeding  Active Problem List     Patient Active Problem List   Diagnosis    Essential hypertension    History of hyperlipidemia    Obstructive sleep apnea    Lightheadedness    Routine health maintenance    COVID-19 virus infection    Elevated serum creatinine       Objective   Physical Exam:  - GEN: Appears well, endomorph tall and muscular body, alert and oriented x 4, pleasant and cooperative however noted to get nervous and agitated quickly, lasts for <1 minute, quickly and easily redirected and express understanding once his concerns addressed/explained; appears in no acute pain    - HEENT: mildly icteric sclera, mucous membranes moist, PERRL and EOMI   - NECK: No lymphadenopathy, JVD or carotid bruits   - HEART: RRR, normal S1 and S2, no murmurs, clicks, gallops or rubs   - LUNGS: Clear to auscultation bilaterally; no wheezes, rales, or rhonchi  - ABDOMEN: Normal bowel sounds, soft, no tenderness, no distention, no organomegaly or masses felt on exam  Negative Rovsing's sign  Pelvic/Digital Rectal Exam remarkable for 2 cm lineal superficial intergluteal deep scratch/cut; no external hemorrhoids/tag visualized, unable to perform KRISSY insertion part d/t non cooperative/tensing gluteal muscles and anal tone; see HPI   - EXTREMITIES: Peripheral pulses normal; no clubbing, cyanosis, or edema  - NEURO: No focal findings, CN II-XII are grossly intact  - Musculoskeletal: 5/5 strength, normal ROM, no swollen or erythematous joints     - SKIN: Normal without suspicious lesions on exposed skin    /95 (BP Location: Right arm, Patient Position: Sitting, Cuff Size: Large)   Pulse 75   Temp 98 3 °F (36 8 °C) (Temporal)   Ht 6' 4" (1 93 m)   Wt (!) 137 kg (302 lb)   BMI 36 76 kg/m²     Pertinent Laboratory/Diagnostic Studies:  CBC:   Lab Results   Component Value Date/Time    WBC 4 86 04/30/2021 07:18 PM    RBC 4 67 04/30/2021 07:18 PM    HGB 12 1 04/30/2021 07:18 PM    HCT 35 5 (L) 04/30/2021 07:18 PM    MCV 76 (L) 04/30/2021 07:18 PM    MCH 25 9 (L) 04/30/2021 07:18 PM    MCHC 34 1 04/30/2021 07:18 PM    RDW 14 6 04/30/2021 07:18 PM    MPV 12 3 04/30/2021 07:18 PM     04/30/2021 07:18 PM    NRBC 0 04/30/2021 07:18 PM    NEUTOPHILPCT 45 04/30/2021 07:18 PM    LYMPHOPCT 44 04/30/2021 07:18 PM    MONOPCT 8 04/30/2021 07:18 PM    EOSPCT 2 04/30/2021 07:18 PM    BASOPCT 1 04/30/2021 07:18 PM    NEUTROABS 2 21 04/30/2021 07:18 PM    LYMPHSABS 2 13 04/30/2021 07:18 PM    MONOSABS 0 38 04/30/2021 07:18 PM    EOSABS 0 10 04/30/2021 07:18 PM     Chemistry Profile:   Lab Results   Component Value Date/Time    K 3 4 (L) 04/30/2021 07:18 PM     (H) 04/30/2021 07:18 PM    CO2 27 04/30/2021 07:18 PM    BUN 21 04/30/2021 07:18 PM    CREATININE 1 61 (H) 04/30/2021 07:18 PM    GLUC 88 04/30/2021 07:18 PM    CALCIUM 8 7 04/30/2021 07:18 PM    AST 32 04/11/2021 02:17 PM    ALT 51 04/11/2021 02:17 PM    ALKPHOS 71 04/11/2021 02:17 PM    EGFR 60 04/30/2021 07:18 PM       Current Medications     Current Outpatient Medications:     divalproex sodium (DEPAKOTE) 500 mg EC tablet, TAKE 2 TABLETS BY MOUTH ONCE A DAY AT BEDTIME, Disp: , Rfl:     QUEtiapine (SEROquel) 50 mg tablet, Take 50 mg by mouth daily at bedtime, Disp: , Rfl:     acetaminophen (TYLENOL) 650 mg CR tablet, Take 1 tablet (650 mg total) by mouth every 8 (eight) hours as needed for mild pain or moderate pain (headache) (Patient not taking: Reported on 6/29/2021), Disp: 30 tablet, Rfl: 1    amLODIPine (NORVASC) 10 mg tablet, Take 1 tablet (10 mg total) by mouth daily, Disp: 30 tablet, Rfl: 1    cholecalciferol (VITAMIN D3) 1,000 units tablet, Take 2 tablets (2,000 Units total) by mouth daily (Patient not taking: Reported on 6/29/2021), Disp: 30 tablet, Rfl: 0    hydrocortisone (ANUSOL-HC) 2 5 % rectal cream, Apply topically 2 (two) times a day, Disp: 28 g, Rfl: 1    Health Maintenance     Health Maintenance   Topic Date Due    BMI: Followup Plan  Never done    Annual Physical  Never done    DTaP,Tdap,and Td Vaccines (1 - Tdap) Never done    Depression Screening  11/28/2019    Influenza Vaccine (1) Never done    COVID-19 Vaccine (3 - Booster for Pfizer series) 12/10/2021    BMI: Adult  03/30/2023    HIV Screening  Completed    Hepatitis C Screening  Completed    Pneumococcal Vaccine: Pediatrics (0 to 5 Years) and At-Risk Patients (6 to 59 Years)  Aged Out    HIB Vaccine  Aged Out    Hepatitis B Vaccine  Aged Out    IPV Vaccine  Aged Out    Hepatitis A Vaccine  Aged Out    Meningococcal ACWY Vaccine  Aged Out    HPV Vaccine  Aged [employer] History   Administered Date(s) Administered    COVID-19 PFIZER VACCINE 0 3 ML IM 06/19/2021, 07/10/2021       Carol Veliz DO   Internal Medicine - [facility] [address]    3/30/2022 10:53 PM

## 2022-04-27 DIAGNOSIS — I10 PRIMARY HYPERTENSION: ICD-10-CM

## 2022-04-28 RX ORDER — AMLODIPINE BESYLATE 10 MG/1
TABLET ORAL
Qty: 30 TABLET | Refills: 1 | Status: SHIPPED | OUTPATIENT
Start: 2022-04-28 | End: 2022-05-23

## 2022-05-09 ENCOUNTER — TELEPHONE (OUTPATIENT)
Dept: INTERNAL MEDICINE CLINIC | Facility: CLINIC | Age: 42
End: 2022-05-09

## 2022-05-09 NOTE — TELEPHONE ENCOUNTER
Bassam Sevilla called from [facility]  They were unable to run the OVA AND PARASITE EXAMINATION  It was in the wrong tube  It needs to be in the pink and grey container  They couldn't run it and they had to cancel the order

## 2022-05-11 NOTE — TELEPHONE ENCOUNTER
Called and advised patient that he has to go back to the lab at the hospital to have Ova and parasite exam completed again  Patient understood and had no further questions or concerns

## 2022-05-23 ENCOUNTER — OFFICE VISIT (OUTPATIENT)
Dept: INTERNAL MEDICINE CLINIC | Facility: CLINIC | Age: 42
End: 2022-05-23

## 2022-05-23 VITALS
DIASTOLIC BLOOD PRESSURE: 75 MMHG | WEIGHT: 303.8 LBS | SYSTOLIC BLOOD PRESSURE: 113 MMHG | OXYGEN SATURATION: 97 % | TEMPERATURE: 98.2 F | HEART RATE: 86 BPM | BODY MASS INDEX: 36.98 KG/M2

## 2022-05-23 DIAGNOSIS — Z86.19 HISTORY OF PARASITIC INFECTION: ICD-10-CM

## 2022-05-23 DIAGNOSIS — Z23 NEED FOR TDAP VACCINATION: ICD-10-CM

## 2022-05-23 DIAGNOSIS — R10.30 LOWER ABDOMINAL PAIN: Primary | ICD-10-CM

## 2022-05-23 DIAGNOSIS — Z23 NEED FOR COVID-19 VACCINE: ICD-10-CM

## 2022-05-23 DIAGNOSIS — L25.9 CONTACT DERMATITIS AND ECZEMA: ICD-10-CM

## 2022-05-23 DIAGNOSIS — B35.0 TINEA CAPITIS: ICD-10-CM

## 2022-05-23 DIAGNOSIS — Z13.31 DEPRESSION SCREENING: ICD-10-CM

## 2022-05-23 DIAGNOSIS — I10 PRIMARY HYPERTENSION: ICD-10-CM

## 2022-05-23 PROCEDURE — 1036F TOBACCO NON-USER: CPT | Performed by: INTERNAL MEDICINE

## 2022-05-23 PROCEDURE — 90715 TDAP VACCINE 7 YRS/> IM: CPT | Performed by: INTERNAL MEDICINE

## 2022-05-23 PROCEDURE — 90471 IMMUNIZATION ADMIN: CPT | Performed by: INTERNAL MEDICINE

## 2022-05-23 PROCEDURE — 99214 OFFICE O/P EST MOD 30 MIN: CPT | Performed by: INTERNAL MEDICINE

## 2022-05-23 RX ORDER — CLOTRIMAZOLE AND BETAMETHASONE DIPROPIONATE 10; .64 MG/G; MG/G
CREAM TOPICAL 2 TIMES DAILY
Qty: 30 G | Refills: 0 | Status: SHIPPED | OUTPATIENT
Start: 2022-05-23 | End: 2022-07-12

## 2022-05-23 NOTE — PROGRESS NOTES
INTERNAL MEDICINE OFFICE VISIT  [facility]  [address] [place] [address]    NAME: Destinee Bray  AGE: 38 y o  SEX: male    DATE OF ENCOUNTER: 5/23/2022    Assessment and Plan     1  Lower abdominal pain  2  History of parasitic infection     As reported in prior notes; [place]x of parasitic infection [place] "big worm was removed per rectum" ; denies fever, N/V/D/C, weight loss, hemoptysis, hematemesis, melena or hematochezia; has ongoing on and off abdominal pain, last for only few seconds when happens, occur 1-2 times a week in average  Alternating LLQ (before) to RLQ (lately) and also patient having a itching and discomfort anal/rectal sensation; digital rectal exam was performed prior visit and was overall unremarkable  Ova and Parasite and Pinworm Prep/scotch were ordered but collected wrong; were reordered and instruction given to redo it at the lab, but not completed  Discussed and confirmed with the patient need to have sample collected at lab ASAP to have tests done prior to considering further steps of diagnosis/management e g  endoscopy/referrals and treatments  CBC overall unremarkable, MCV borderline low  3  Contact dermatitis and eczema  9  Tinea capitis    2 x 1 cm patch, dry, white , behind L ear , noted been there for 1-2 month , at skin fold site, fungal infection vs contact dermatitis d/t mask straps ; itching; other smaller but looks different 3-5 areas 3x4 cm each of dry/scaly skin on scalp noted as well, but no baldness; suggestive of Tinea Capitis  - clotrimazole-betamethasone (LOTRISONE) 1-0 05 % cream; Apply topically 2 (two) times a day Apply on affected area behind L ear twice daily  Dispense: 30 g; Refill: 0  - selenium sulfide (SELSUN) 1 %; Apply topically 2 (two) times a week  Dispense: 325 mL; Refill: 1    4   BMI 36 0-36 9,adult  Counseled on low carb, low-fat diet and exercise at least 3 times a week X 45 minutes brisk walking    5  Depression screening  PHQ 2-, however patient has known depression history on Depakote and Seroquel, to continue follow-up with psych    6  Primary hypertension  Now well controlled on Norvasc 10 mg daily  Counseled on low-salt diet and exercise    7  Need for Tdap vaccination  Offered and accepted, administered    8  Need for COVID-19 vaccine  Due for the booster/3rd dose, counseled on arranging and patient agreeable        Orders Placed This Encounter   Procedures    TDAP VACCINE GREATER THAN OR EQUAL TO 6YO IM           Chief Complaint     Chief Complaint   Patient presents with    Follow-up       History of Present Illness     Fili Moffett 38years old male with past medical history remarkable for SAHIL, HTN, CKD 2, presented today for follow-up on lower abdominal pain X few months , see above assessment and plan for more details, unfortunately ordered test was not done yet, information provided and asked to please do that test as soon as possible at the lab and patient verbalized understanding and agreement, with potential following plan based on the findings explained to the patient including referral to GI/endoscopy/antiparasitic treatment versus advanced imaging  Patient asked of the duration require to be on the blood pressure medication today if he can discontinue taking it or not explained to the patient that blood pressure is now well controlled because of the medication, encouraged for more exercise low-salt diet and to continue the Norvasc for now till further evaluation  Denies any side effect of the medication and verbalized understanding and agreement of above detailed assessment and plan  Scheduled for follow-up in 3 months however will be looking to get the ordered test results and manage afterward as appropriate       The following portions of the patient's history were reviewed and updated as appropriate: allergies, current medications, past family history, past medical history, past social history, past surgical history and problem list     Review of Systems   - GENERAL: Negative for any nausea, vomiting, fevers, chills, or weight loss  - HEENT: Negative for any head/Neck trauma, pain, double/blurry vision, sinusitis, rhinitis, nose bleeding   - CARDIAC: Negative for any chest pain, palpitation, Dyspnea on exertion, peripheral edema  - PULMONARY: Negative for any SOB, cough, wheezing    - GASTROINTESTINAL:  Positive for abdominal pain and anal itching, Negative for any N/V/D/C, blood in stool    - GENITOURINARY: Negative for any dysuria, hematuria, incontinence   - NEUROLOGIC: Negative for any muscle weakness, numbness/tingling, memory changes  - MUSCULOSKELETAL: Negative for any joint pains/swelling, limited ROM  - INTEGUMENTARY: Negative for any rashes, cuts/ lesions   - HEMATOLOGIC: Negative for any abnormal bruising, frequent infections or bleeding  Review of Systems    Active Problem List     Patient Active Problem List   Diagnosis    Essential hypertension    History of hyperlipidemia    Obstructive sleep apnea    Lightheadedness    Routine health maintenance    COVID-19 virus infection    Elevated serum creatinine    CKD (chronic kidney disease) stage 2, GFR 60-89 ml/min       Objective   Physical Exam:  - GEN: Appears well, alert and oriented x 3, pleasant and cooperative, in no acute distress  - HEENT:  Icteric sclera, mucous membranes moist, PERRL and EOMI   - NECK: No lymphadenopathy, JVD or carotid bruits   - HEART: RRR, normal S1 and S2, no murmurs, clicks, gallops or rubs   - LUNGS: Clear to auscultation bilaterally; no wheezes, rales, or rhonchi  - ABDOMEN: Normal bowel sounds, soft, no tenderness, no distention, no organomegaly or masses felt on exam    - EXTREMITIES: Peripheral pulses normal; no clubbing, cyanosis, or edema  - NEURO: No focal findings, CN II-XII are grossly intact     - Musculoskeletal: 5/5 strength, normal ROM, no swollen or erythematous joints     - SKIN: Normal without suspicious lesions on exposed skin    /75 (BP Location: Left arm, Patient Position: Sitting, Cuff Size: Large)   Pulse 86   Temp 98 2 °F (36 8 °C) (Temporal)   Wt (!) 138 kg (303 lb 12 8 oz)   SpO2 97%   BMI 36 98 kg/m²     Physical Exam    Pertinent Laboratory/Diagnostic Studies:  CBC:   Lab Results   Component Value Date/Time    WBC 4 86 04/30/2021 07:18 PM    RBC 4 67 04/30/2021 07:18 PM    HGB 12 1 04/30/2021 07:18 PM    HCT 35 5 (L) 04/30/2021 07:18 PM    MCV 76 (L) 04/30/2021 07:18 PM    MCH 25 9 (L) 04/30/2021 07:18 PM    MCHC 34 1 04/30/2021 07:18 PM    RDW 14 6 04/30/2021 07:18 PM    MPV 12 3 04/30/2021 07:18 PM     04/30/2021 07:18 PM    NRBC 0 04/30/2021 07:18 PM    NEUTOPHILPCT 45 04/30/2021 07:18 PM    LYMPHOPCT 44 04/30/2021 07:18 PM    MONOPCT 8 04/30/2021 07:18 PM    EOSPCT 2 04/30/2021 07:18 PM    BASOPCT 1 04/30/2021 07:18 PM    NEUTROABS 2 21 04/30/2021 07:18 PM    LYMPHSABS 2 13 04/30/2021 07:18 PM    MONOSABS 0 38 04/30/2021 07:18 PM    EOSABS 0 10 04/30/2021 07:18 PM     Chemistry Profile:   Lab Results   Component Value Date/Time    K 3 4 (L) 04/30/2021 07:18 PM     (H) 04/30/2021 07:18 PM    CO2 27 04/30/2021 07:18 PM    BUN 21 04/30/2021 07:18 PM    CREATININE 1 61 (H) 04/30/2021 07:18 PM    GLUC 88 04/30/2021 07:18 PM    CALCIUM 8 7 04/30/2021 07:18 PM    AST 32 04/11/2021 02:17 PM    ALT 51 04/11/2021 02:17 PM    ALKPHOS 71 04/11/2021 02:17 PM    EGFR 60 04/30/2021 07:18 PM       Current Medications     Current Outpatient Medications:     acetaminophen (TYLENOL) 650 mg CR tablet, Take 1 tablet (650 mg total) by mouth every 8 (eight) hours as needed for mild pain or moderate pain (headache), Disp: 30 tablet, Rfl: 1    clotrimazole-betamethasone (LOTRISONE) 1-0 05 % cream, Apply topically 2 (two) times a day Apply on affected area behind L ear twice daily, Disp: 30 g, Rfl: 0    divalproex sodium (DEPAKOTE) 500 mg EC tablet, TAKE 2 TABLETS BY MOUTH ONCE A DAY AT BEDTIME, Disp: , Rfl:     hydrocortisone (ANUSOL-HC) 2 5 % rectal cream, Apply topically 2 (two) times a day, Disp: 28 g, Rfl: 1    QUEtiapine (SEROquel) 50 mg tablet, Take 50 mg by mouth daily at bedtime, Disp: , Rfl:     selenium sulfide (SELSUN) 1 %, Apply topically 2 (two) times a week, Disp: 325 mL, Rfl: 1    amLODIPine (NORVASC) 10 mg tablet, TAKE 1 TABLET BY MOUTH EVERY DAY, Disp: 90 tablet, Rfl: 3    cholecalciferol (VITAMIN D3) 1,000 units tablet, Take 2 tablets (2,000 Units total) by mouth daily (Patient not taking: No sig reported), Disp: 30 tablet, Rfl: 0    Health Maintenance     Health Maintenance   Topic Date Due    BMI: Followup Plan  Never done    Annual Physical  Never done    Depression Screening  11/28/2019    COVID-19 Vaccine (3 - Booster for Pfizer series) 12/10/2021    Influenza Vaccine (Season Ended) 09/01/2022    BMI: Adult  05/23/2023    DTaP,Tdap,and Td Vaccines (2 - Td or Tdap) 05/23/2032    HIV Screening  Completed    Hepatitis C Screening  Completed    Pneumococcal Vaccine: Pediatrics (0 to 5 Years) and At-Risk Patients (6 to 59 Years)  Aged Out    HIB Vaccine  Aged Out    Hepatitis B Vaccine  Aged Out    IPV Vaccine  Aged Out    Hepatitis A Vaccine  Aged Out    Meningococcal ACWY Vaccine  Aged Out    HPV Vaccine  Aged [employer] History   Administered Date(s) Administered    COVID-19 PFIZER VACCINE 0 3 ML IM 06/19/2021, 07/10/2021    Tdap 05/23/2022       Acacia Reddy DO   Internal Medicine - PGY2  [facility]    5/23/2022 3:46 PM

## 2022-06-09 ENCOUNTER — APPOINTMENT (OUTPATIENT)
Dept: LAB | Facility: CLINIC | Age: 42
End: 2022-06-09
Payer: COMMERCIAL

## 2022-06-09 DIAGNOSIS — R10.32 LLQ PAIN: ICD-10-CM

## 2022-06-09 DIAGNOSIS — Z86.19 HISTORY OF PARASITIC INFECTION: ICD-10-CM

## 2022-06-09 PROCEDURE — 87177 OVA AND PARASITES SMEARS: CPT

## 2022-06-09 PROCEDURE — 87209 SMEAR COMPLEX STAIN: CPT

## 2022-07-01 DIAGNOSIS — B35.0 TINEA CAPITIS: ICD-10-CM

## 2022-07-07 NOTE — TELEPHONE ENCOUNTER
Patient called for the status - patient advised escript at the pharmacy    Patient said thank you and will pick it up 7/7/22

## 2022-07-11 DIAGNOSIS — L25.9 CONTACT DERMATITIS AND ECZEMA: ICD-10-CM

## 2022-07-12 RX ORDER — CLOTRIMAZOLE AND BETAMETHASONE DIPROPIONATE 10; .64 MG/G; MG/G
CREAM TOPICAL
Qty: 30 G | Refills: 0 | Status: SHIPPED | OUTPATIENT
Start: 2022-07-12

## 2022-09-20 DIAGNOSIS — L29.0 ANAL ITCHING: ICD-10-CM

## 2022-09-20 RX ORDER — HYDROCORTISONE 25 MG/G
CREAM TOPICAL 2 TIMES DAILY
Qty: 28 G | Refills: 1 | Status: SHIPPED | OUTPATIENT
Start: 2022-09-20

## 2022-11-09 ENCOUNTER — OFFICE VISIT (OUTPATIENT)
Dept: INTERNAL MEDICINE CLINIC | Facility: CLINIC | Age: 42
End: 2022-11-09

## 2022-11-09 ENCOUNTER — APPOINTMENT (OUTPATIENT)
Dept: LAB | Facility: CLINIC | Age: 42
End: 2022-11-09

## 2022-11-09 VITALS
BODY MASS INDEX: 35.8 KG/M2 | WEIGHT: 294 LBS | DIASTOLIC BLOOD PRESSURE: 87 MMHG | HEART RATE: 76 BPM | HEIGHT: 76 IN | OXYGEN SATURATION: 100 % | SYSTOLIC BLOOD PRESSURE: 131 MMHG | TEMPERATURE: 98.1 F

## 2022-11-09 DIAGNOSIS — F12.90 MARIJUANA USE: ICD-10-CM

## 2022-11-09 DIAGNOSIS — L29.0 ANAL ITCHING: ICD-10-CM

## 2022-11-09 DIAGNOSIS — R71.8 MICROCYTIC ERYTHROCYTES: ICD-10-CM

## 2022-11-09 DIAGNOSIS — R07.9 EXERTIONAL CHEST PAIN: Primary | ICD-10-CM

## 2022-11-09 DIAGNOSIS — R07.9 EXERTIONAL CHEST PAIN: ICD-10-CM

## 2022-11-09 DIAGNOSIS — G47.33 OBSTRUCTIVE SLEEP APNEA: ICD-10-CM

## 2022-11-09 DIAGNOSIS — I10 ESSENTIAL HYPERTENSION: ICD-10-CM

## 2022-11-09 LAB
BASOPHILS # BLD AUTO: 0.03 THOUSANDS/ÂΜL (ref 0–0.1)
BASOPHILS NFR BLD AUTO: 1 % (ref 0–1)
CHOLEST SERPL-MCNC: 189 MG/DL
EOSINOPHIL # BLD AUTO: 0.1 THOUSAND/ÂΜL (ref 0–0.61)
EOSINOPHIL NFR BLD AUTO: 3 % (ref 0–6)
ERYTHROCYTE [DISTWIDTH] IN BLOOD BY AUTOMATED COUNT: 14.7 % (ref 11.6–15.1)
FERRITIN SERPL-MCNC: 102 NG/ML (ref 8–388)
HCT VFR BLD AUTO: 43.6 % (ref 36.5–49.3)
HDLC SERPL-MCNC: 43 MG/DL
HGB BLD-MCNC: 14.4 G/DL (ref 12–17)
IMM GRANULOCYTES # BLD AUTO: 0 THOUSAND/UL (ref 0–0.2)
IMM GRANULOCYTES NFR BLD AUTO: 0 % (ref 0–2)
IRON SATN MFR SERPL: 32 % (ref 20–50)
IRON SERPL-MCNC: 93 UG/DL (ref 65–175)
LDLC SERPL CALC-MCNC: 127 MG/DL (ref 0–100)
LYMPHOCYTES # BLD AUTO: 1.64 THOUSANDS/ÂΜL (ref 0.6–4.47)
LYMPHOCYTES NFR BLD AUTO: 42 % (ref 14–44)
MCH RBC QN AUTO: 25.4 PG (ref 26.8–34.3)
MCHC RBC AUTO-ENTMCNC: 33 G/DL (ref 31.4–37.4)
MCV RBC AUTO: 77 FL (ref 82–98)
MONOCYTES # BLD AUTO: 0.26 THOUSAND/ÂΜL (ref 0.17–1.22)
MONOCYTES NFR BLD AUTO: 7 % (ref 4–12)
NEUTROPHILS # BLD AUTO: 1.87 THOUSANDS/ÂΜL (ref 1.85–7.62)
NEUTS SEG NFR BLD AUTO: 47 % (ref 43–75)
NRBC BLD AUTO-RTO: 0 /100 WBCS
PLATELET # BLD AUTO: 230 THOUSANDS/UL (ref 149–390)
PMV BLD AUTO: 11.7 FL (ref 8.9–12.7)
RBC # BLD AUTO: 5.68 MILLION/UL (ref 3.88–5.62)
TIBC SERPL-MCNC: 288 UG/DL (ref 250–450)
TRIGL SERPL-MCNC: 95 MG/DL
WBC # BLD AUTO: 3.9 THOUSAND/UL (ref 4.31–10.16)

## 2022-11-09 RX ORDER — DIVALPROEX SODIUM 250 MG/1
TABLET, EXTENDED RELEASE ORAL
COMMUNITY
Start: 2022-09-27

## 2022-11-09 RX ORDER — HYDROCORTISONE 25 MG/G
CREAM TOPICAL 2 TIMES DAILY
Qty: 28 G | Refills: 3 | Status: SHIPPED | OUTPATIENT
Start: 2022-11-09

## 2022-11-10 NOTE — PROGRESS NOTES
INTERNAL MEDICINE OFFICE VISIT  [facility]  [address] [place] [address]    NAME: Kaylee Heath  AGE: 38 y o  SEX: male    DATE OF ENCOUNTER: 11/9/2022    Assessment and Plan     1  Exertional chest pain  - Noted intermittent chest pain "sometime when active e g  sex or treadmill" but "also happens at rest"; noted "happens more if skipping Depakote"!    - denies sharp pain, describes it more as tightness, lasts only 15-30 seconds, mild to moderate, and overall self limited  - risk factors may include male, smoker (smoke Marijuana), reported hx of hyperlipidemia, BMI 35+, AA  Denies family hx of CAD  - Denies palpitation, diaphoresis, fever, SOB/TRUJILLO or leg edema  Denies dyspepsia or GERD symptoms  Differentials include unstable angina/CAD, GI etiology/GERD, arrhythmia/PAF, Depakote side effect (although less likely), MSK (but not reproducible on palpation), vs illness anxiety disorder/psych etiology (has psych hx on Depakote & Seroquel per Psych)  Will defer ASA/Statin, other workup/management till the following workup complete and revaluate in few weeks  Patient made aware, and to call/seek care if worsening/new concerning symptoms  PLAN   - Lipid Panel with Direct LDL reflex; Future  - Stress test only, exercise; Future  - Echo complete w/ contrast if indicated; Future  - POCT ECG    2  Anal itching/ fissure  - Examined with chaperon, myself and Dr Chriss Lobato in the room, with patient's permission and agreement  - posterior side of anus has previously noted anal fissure now healed, no cut, fissure, lump, swelling, erythema or discharge  - Ova and parasite was negative; and scotch tape worm was ordered but not done     - reports improvement when used the Anusol but ran out of it   - see prior visits notes for more details on this itching    - hydrocortisone (ANUSOL-HC) 2 5 % rectal cream; Apply topically 2 (two) times a day  Dispense: 28 g; Refill: 3    3  Microcytic erythrocytes   Latest Reference Range & Units 01/27/19 04/11/21 04/30/21   Hemoglobin 12 0 - 17 0 g/dL 14 4 13 8 12 1   Although WNL but lower trending Hgb noted on previous CBC; with previously reported blood streak when wiping in context of known anal fissure  Will recheck CBC and iron panel and revaluate  MCV noted low, in 70's, differential include but not limited to SIRENA and Thalassemia  If abnormal workup or concerning/not improving symptoms, will consider GI referral for potential need for sigmoidoscopy/colonoscopy  - Iron Panel (Includes Ferritin, Iron Sat%, Iron, and TIBC); Future  - CBC and differential; Future    4  Obstructive sleep apnea  Noted not using his CPAP for unclear reason, readdressed benefits and need for adherence with CPAP given SAHIL & HTN and if needed can see Sleep Medicine if mask fitting is an issue  Patient understand  5  Essential hypertension  Acceptable 131/87 on Norvasc 10 ; will continue monitor and revaluate next visit  6  Daily Marijuana use   Advised on moderation/cutting down as possible if not cessation at all, explained possible health complications  In pre contemplation  Orders Placed This Encounter   Procedures   • Lipid Panel with Direct LDL reflex   • CBC and differential   • Stress test only, exercise   • POCT ECG   • Echo complete w/ contrast if indicated     BMI Counseling: Body mass index is 35 79 kg/m²  The BMI is above normal  Nutrition recommendations include reducing portion sizes and decreasing overall calorie intake  Exercise recommendations include exercising 3-5 times per week      - Counseling Documentation: patient was counseled regarding: diagnostic results, instructions for management, risk factor reductions and importance of compliance with treatment    Chief Complaint     Chief Complaint   Patient presents with   • Annual Exam       History of Present Illness     Bc Jarad is a  39 yo M, AA, w pmh remarkable for SAHIL, HTN, CKD2, HLD, remote parasitic infection hx, anal fissure, marijuana use who presents today for Annual Physical Exam, however he brought in above concerns mainly chest pain, which were addressed in details as above  See above A&P for details  The following portions of the patient's history were reviewed and updated as appropriate: allergies, current medications, past family history, past medical history, past social history, past surgical history and problem list     Review of Systems     Review of Systems   Constitutional: Negative for activity change, appetite change, chills, diaphoresis, fatigue and fever  HENT: Negative for dental problem, nosebleeds, rhinorrhea, sore throat, tinnitus and trouble swallowing  Eyes: Negative for photophobia and visual disturbance  Respiratory: Positive for chest tightness  Negative for cough, choking, shortness of breath, wheezing and stridor  Cardiovascular: Positive for chest pain  Negative for palpitations and leg swelling  More of tightness or discomfort, not sharp pain, see A&P for details  Gastrointestinal: Positive for anal bleeding  Negative for abdominal pain, blood in stool, constipation, diarrhea, nausea and vomiting  Used to have blood strikes when wiping few month ago when anal fissure was identified and reported constipation that time   Genitourinary: Negative for dysuria, flank pain and hematuria  Musculoskeletal: Negative for arthralgias, back pain, joint swelling, myalgias and neck pain  Skin: Negative for rash and wound  Neurological: Negative for dizziness, weakness, light-headedness and headaches  Psychiatric/Behavioral: Negative for agitation, behavioral problems and suicidal ideas         Active Problem List     Patient Active Problem List   Diagnosis   • Essential hypertension   • History of hyperlipidemia   • Obstructive sleep apnea   • Lightheadedness   • Routine health maintenance   • COVID-19 virus infection   • Elevated serum creatinine   • CKD (chronic kidney disease) stage 2, GFR 60-89 ml/min   • Anal itching   • Microcytic erythrocytes   • Exertional chest pain       Objective     /87 (BP Location: Right arm, Patient Position: Sitting, Cuff Size: Large)   Pulse 76   Temp 98 1 °F (36 7 °C) (Temporal)   Ht 6' 4" (1 93 m)   Wt 133 kg (294 lb)   SpO2 100%   BMI 35 79 kg/m²     Physical Exam  Vitals reviewed  Exam conducted with a chaperone present  Constitutional:       General: He is not in acute distress  Appearance: Normal appearance  He is obese  He is not ill-appearing, toxic-appearing or diaphoretic  HENT:      Head: Normocephalic and atraumatic  Right Ear: External ear normal       Left Ear: External ear normal       Nose: Nose normal  No congestion or rhinorrhea  Mouth/Throat:      Mouth: Mucous membranes are moist       Pharynx: Oropharynx is clear  Eyes:      General: Scleral icterus present  Right eye: No discharge  Left eye: No discharge  Extraocular Movements: Extraocular movements intact  Conjunctiva/sclera: Conjunctivae normal       Pupils: Pupils are equal, round, and reactive to light  Cardiovascular:      Rate and Rhythm: Normal rate and regular rhythm  Pulses: Normal pulses  Heart sounds: Normal heart sounds  No murmur heard  No gallop  Pulmonary:      Effort: Pulmonary effort is normal  No respiratory distress  Breath sounds: No wheezing or rales  Abdominal:      General: Abdomen is flat  Bowel sounds are normal  There is no distension  Palpations: Abdomen is soft  There is no mass  Tenderness: There is no abdominal tenderness  There is no guarding or rebound  Genitourinary:     Rectum: Normal       Comments: posterior surface of anus has previously noted anal fissure now almost healed, no cut, fissure, lump, swelling, erythema or discharge noted in the anus neither felt on KRISSY   Normal rectal tone   Musculoskeletal:         General: No swelling or deformity  Normal range of motion  Cervical back: Normal range of motion and neck supple  No rigidity  Right lower leg: No edema  Left lower leg: No edema  Lymphadenopathy:      Cervical: No cervical adenopathy  Skin:     General: Skin is warm  Capillary Refill: Capillary refill takes less than 2 seconds  Coloration: Skin is not jaundiced  Findings: No erythema, lesion or rash  Neurological:      General: No focal deficit present  Mental Status: He is alert and oriented to person, place, and time  Mental status is at baseline  Cranial Nerves: No cranial nerve deficit  Motor: No weakness  Psychiatric:         Mood and Affect: Mood normal          Behavior: Behavior normal          Thought Content:  Thought content normal          Judgment: Judgment normal          Pertinent Laboratory/Diagnostic Studies:  CBC:   Lab Results   Component Value Date/Time    WBC 3 90 (L) 11/09/2022 11:26 AM    RBC 5 68 (H) 11/09/2022 11:26 AM    HGB 14 4 11/09/2022 11:26 AM    HCT 43 6 11/09/2022 11:26 AM    MCV 77 (L) 11/09/2022 11:26 AM    MCH 25 4 (L) 11/09/2022 11:26 AM    MCHC 33 0 11/09/2022 11:26 AM    RDW 14 7 11/09/2022 11:26 AM    MPV 11 7 11/09/2022 11:26 AM     11/09/2022 11:26 AM    NRBC 0 11/09/2022 11:26 AM    NEUTOPHILPCT 47 11/09/2022 11:26 AM    LYMPHOPCT 42 11/09/2022 11:26 AM    MONOPCT 7 11/09/2022 11:26 AM    EOSPCT 3 11/09/2022 11:26 AM    BASOPCT 1 11/09/2022 11:26 AM    NEUTROABS 1 87 11/09/2022 11:26 AM    LYMPHSABS 1 64 11/09/2022 11:26 AM    MONOSABS 0 26 11/09/2022 11:26 AM    EOSABS 0 10 11/09/2022 11:26 AM     Chemistry Profile:   Lab Results   Component Value Date/Time    K 3 4 (L) 04/30/2021 07:18 PM     (H) 04/30/2021 07:18 PM    CO2 27 04/30/2021 07:18 PM    BUN 21 04/30/2021 07:18 PM    CREATININE 1 61 (H) 04/30/2021 07:18 PM    GLUC 88 04/30/2021 07:18 PM    CALCIUM 8 7 04/30/2021 07:18 PM    AST 32 04/11/2021 02:17 PM    ALT 51 04/11/2021 02:17 PM    ALKPHOS 71 04/11/2021 02:17 PM    EGFR 60 04/30/2021 07:18 PM     Cardiac Studies:   Lab Results   Component Value Date/Time    TROPONINI 0 02 04/30/2021 10:29 PM     Endocrine Studies:   Lab Results   Component Value Date/Time    TRIG 95 11/09/2022 11:26 AM    CHOLESTEROL 189 11/09/2022 11:26 AM    HDL 43 11/09/2022 11:26 AM    LDLCALC 127 (H) 11/09/2022 11:26 AM     Health Maintenance:   Lab Results   Component Value Date/Time    HEPCAB Non-reactive 06/29/2021 11:51 AM       Current Medications     Current Outpatient Medications:   •  acetaminophen (TYLENOL) 650 mg CR tablet, Take 1 tablet (650 mg total) by mouth every 8 (eight) hours as needed for mild pain or moderate pain (headache), Disp: 30 tablet, Rfl: 1  •  amLODIPine (NORVASC) 10 mg tablet, TAKE 1 TABLET BY MOUTH EVERY DAY, Disp: 90 tablet, Rfl: 3  •  clotrimazole-betamethasone (LOTRISONE) 1-0 05 % cream, APPLY TOPICALLY 2 (TWO) TIMES A DAY APPLY ON AFFECTED AREA BEHIND LEFT EAR TWICE DAILY, Disp: 30 g, Rfl: 0  •  divalproex sodium (DEPAKOTE ER) 250 mg 24 hr tablet, TAKE 3 ORAL TABLETS ONCE A DAY AT BEDTIME, Disp: , Rfl:   •  hydrocortisone (ANUSOL-HC) 2 5 % rectal cream, Apply topically 2 (two) times a day, Disp: 28 g, Rfl: 3  •  QUEtiapine (SEROquel) 50 mg tablet, Take 50 mg by mouth daily at bedtime, Disp: , Rfl:   •  selenium sulfide (SELSUN) 1 %, Apply topically 2 (two) times a week, Disp: 325 mL, Rfl: 3  •  cholecalciferol (VITAMIN D3) 1,000 units tablet, Take 2 tablets (2,000 Units total) by mouth daily (Patient not taking: No sig reported), Disp: 30 tablet, Rfl: 0    Health Maintenance     Health Maintenance   Topic Date Due   • BMI: Followup Plan  Never done   • Annual Physical  Never done   • COVID-19 Vaccine (3 - Booster for Pfizer series) 02/09/2023 (Originally 12/10/2021)   • Influenza Vaccine (1) 06/30/2023 (Originally 9/1/2022)   • Depression Screening  11/09/2023   • BMI: Adult  11/09/2023   • DTaP,Tdap,and Td Vaccines (2 - Td or Tdap) 05/23/2032   • HIV Screening  Completed   • Hepatitis C Screening  Completed   • Pneumococcal Vaccine: Pediatrics (0 to 5 Years) and At-Risk Patients (6 to 59 Years)  Aged Out   • HIB Vaccine  Aged Out   • Hepatitis B Vaccine  Aged Out   • IPV Vaccine  Aged Out   • Hepatitis A Vaccine  Aged Out   • Meningococcal ACWY Vaccine  Aged Out   • HPV Vaccine  Aged [employer] History   Administered Date(s) Administered   • COVID-19 PFIZER VACCINE 0 3 ML IM 06/19/2021, 07/10/2021   • Tdap 05/23/2022       Faraz Riddle, [address]

## 2022-11-10 NOTE — PATIENT INSTRUCTIONS

## 2022-11-25 ENCOUNTER — HOSPITAL ENCOUNTER (OUTPATIENT)
Dept: NON INVASIVE DIAGNOSTICS | Facility: HOSPITAL | Age: 42
Discharge: HOME | End: 2022-11-25

## 2022-11-25 VITALS
WEIGHT: 294 LBS | HEIGHT: 76 IN | HEART RATE: 76 BPM | BODY MASS INDEX: 35.8 KG/M2 | SYSTOLIC BLOOD PRESSURE: 131 MMHG | DIASTOLIC BLOOD PRESSURE: 87 MMHG

## 2022-11-25 VITALS — WEIGHT: 294 LBS | BODY MASS INDEX: 35.8 KG/M2 | HEIGHT: 76 IN

## 2022-11-25 DIAGNOSIS — R07.9 EXERTIONAL CHEST PAIN: ICD-10-CM

## 2022-11-25 LAB
ARRHY DURING EX: NORMAL
BASELINE ST DEPRESSION: 0 MM
CHEST PAIN STATEMENT: NORMAL
MAX DIASTOLIC BP: 90 MMHG
MAX HEART RATE: 179 BPM
MAX HR PERCENT: 101 %
MAX HR: 179 BPM
MAX PREDICTED HEART RATE: 176 BPM
MAX. SYSTOLIC BP: 180 MMHG
PROTOCOL NAME: NORMAL
RATE PRESSURE PRODUCT: NORMAL
REASON FOR TERMINATION: NORMAL
SL CV STRESS RECOVERY BP: NORMAL MMHG
SL CV STRESS RECOVERY HR: 104 BPM
SL CV STRESS RECOVERY O2 SAT: 98 %
SL CV STRESS STAGE REACHED: 3
STRESS ANGINA INDEX: 0
STRESS BASELINE BP: NORMAL MMHG
STRESS BASELINE HR: 71 BPM
STRESS DUKE TREADMILL SCORE: 9
STRESS O2 SAT REST: 98 %
STRESS PEAK HR: 179 BPM
STRESS PERCENT HR: 101 %
STRESS POST ESTIMATED WORKLOAD: 10.3 METS
STRESS POST EXERCISE DUR MIN: 9 MIN
STRESS POST EXERCISE DUR SEC: 10 SEC
STRESS POST O2 SAT PEAK: 98 %
STRESS POST PEAK BP: 168 MMHG
STRESS ST DEPRESSION: 0 MM
STRESS ST ELEVATION: 0 MM
TARGET HR FORMULA: NORMAL
TEST INDICATION: NORMAL
TIME IN EXERCISE PHASE: NORMAL

## 2022-11-27 LAB
AORTIC ROOT: 3.1 CM
AORTIC VALVE MEAN VELOCITY: 9.1 M/S
APICAL FOUR CHAMBER EJECTION FRACTION: 75 %
ASCENDING AORTA: 3.1 CM
AV LVOT MEAN GRADIENT: 2 MMHG
AV LVOT PEAK GRADIENT: 5 MMHG
AV MEAN GRADIENT: 4 MMHG
AV PEAK GRADIENT: 7 MMHG
AV VELOCITY RATIO: 0.82
DOP CALC AO PEAK VEL: 1.3 M/S
DOP CALC AO VTI: 27.71 CM
DOP CALC LVOT PEAK VEL VTI: 19.9 CM
DOP CALC LVOT PEAK VEL: 1.06 M/S
E WAVE DECELERATION TIME: 231 MS
FRACTIONAL SHORTENING: 37 % (ref 28–44)
INTERVENTRICULAR SEPTUM IN DIASTOLE (PARASTERNAL SHORT AXIS VIEW): 0.7 CM
INTERVENTRICULAR SEPTUM: 0.7 CM (ref 0.6–1.1)
LAAS-AP2: 10.2 CM2
LAAS-AP4: 17.3 CM2
LEFT ATRIUM SIZE: 4 CM
LEFT INTERNAL DIMENSION IN SYSTOLE: 3.4 CM (ref 2.1–4)
LEFT VENTRICULAR INTERNAL DIMENSION IN DIASTOLE: 5.4 CM (ref 3.5–6)
LEFT VENTRICULAR POSTERIOR WALL IN END DIASTOLE: 0.7 CM
LEFT VENTRICULAR STROKE VOLUME: 94 ML
LVSV (TEICH): 94 ML
MV E'TISSUE VEL-SEP: 10 CM/S
MV PEAK A VEL: 0.8 M/S
MV PEAK E VEL: 103 CM/S
MV STENOSIS PRESSURE HALF TIME: 67 MS
MV VALVE AREA P 1/2 METHOD: 3.28 CM2
RIGHT VENTRICLE ID DIMENSION: 4 CM
SL CV LEFT ATRIUM LENGTH A2C: 4.1 CM
SL CV PED ECHO LEFT VENTRICLE DIASTOLIC VOLUME (MOD BIPLANE) 2D: 142 ML
SL CV PED ECHO LEFT VENTRICLE SYSTOLIC VOLUME (MOD BIPLANE) 2D: 48 ML
TR MAX PG: 23 MMHG
TR PEAK VELOCITY: 2.4 M/S
TRICUSPID VALVE PEAK REGURGITATION VELOCITY: 2.38 M/S

## 2022-12-14 ENCOUNTER — APPOINTMENT (OUTPATIENT)
Dept: URGENT CARE | Age: 42
End: 2022-12-14

## 2022-12-14 ENCOUNTER — APPOINTMENT (OUTPATIENT)
Dept: RADIOLOGY | Age: 42
End: 2022-12-14

## 2022-12-14 DIAGNOSIS — M25.561 ACUTE PAIN OF RIGHT KNEE: Primary | ICD-10-CM

## 2022-12-14 DIAGNOSIS — M25.561 ACUTE PAIN OF RIGHT KNEE: ICD-10-CM

## 2022-12-19 ENCOUNTER — APPOINTMENT (OUTPATIENT)
Dept: URGENT CARE | Age: 42
End: 2022-12-19

## 2022-12-28 ENCOUNTER — APPOINTMENT (OUTPATIENT)
Dept: URGENT CARE | Age: 42
End: 2022-12-28

## 2023-01-17 ENCOUNTER — TELEPHONE (OUTPATIENT)
Dept: INTERNAL MEDICINE CLINIC | Facility: CLINIC | Age: 43
End: 2023-01-17

## 2023-01-17 NOTE — TELEPHONE ENCOUNTER
I received a call from patient because he wanted to express his concerns about how he's being treated at [facility]  Anthony Keep explained that he's been experiencing chest pains and he was seen back in Nov  for these symptoms  He had a stress test done, as well as several labs  He was not notified about the outcome of his results  Anthony Santa shared that he believes that he is being treated this way because he is a black man  He do not think that it is okay that his labs were completed weeks ago and he has yet to hear from somebody but instead he has a follow up appointment  He stated that [facility] is a scheme to get money and that he is thinking about taking actions legally     Anthony Santa would like to speak with management regarding this matter because he want to personally express his disgust      Patient will be at work until [address] [facility] today but is free tomorrow at 1pm

## 2023-01-18 NOTE — TELEPHONE ENCOUNTER
I sent a tiger text message to Dr Theresa Chatterjee to review the results of the labs and stress test

## 2023-01-19 ENCOUNTER — TELEPHONE (OUTPATIENT)
Dept: ENDOCRINOLOGY | Facility: CLINIC | Age: 43
End: 2023-01-19

## 2023-01-19 NOTE — TELEPHONE ENCOUNTER
I received a response from Dr Benson Herman that he spoke with the patient on 1/18/23 and will document his conversation today

## 2023-01-19 NOTE — PROGRESS NOTES
I  Called and spoke with Rell Shall 01/18 around 5 pm; he reported that he was concerned about his chest pain, intermittent, unchanged from before  Patient was seen last visit on 11/09/2022, chronic intermittent chest discomfort was addressed (chest pain was documented as early as 2016), and was sent in for stress test and echo, with plan to follow up in 5-6 weeks for further eval and management, has appointment scheduled on 01/20/2023     Explained to the patient that the Stress test was Normal and advised to keep his appointment as scheduled on 01/20/2023 for further revaluation, exam and discussion further, explained that his pain is chronic and stress was assuring so unless he has any NEW/worsening symptoms requiring urgent evaluation/going to the ED, to continue follow up as planned for further evaluation and management  All his questions answered appropriately and he denies any further questions at the moment and noted that the pain frequency and intensity, mild intermittent each time last only few seconds have not changed than before and he confirms intent to follow up as scheduled       Doug Arredondo, [address]

## 2023-01-19 NOTE — TELEPHONE ENCOUNTER
DO TREVOR Mazariegos     4:11 PM  Note    I  Called and spoke with Chantel  01/18 around 5 pm; he reported that he was concerned about his chest pain, intermittent, unchanged from before  Patient was seen last visit on 11/09/2022, chronic intermittent chest discomfort was addressed (chest pain was documented as early as 2016), and was sent in for stress test and echo, with plan to follow up in 5-6 weeks for further eval and management, has appointment scheduled on 01/20/2023      Explained to the patient that the Stress test was Normal and advised to keep his appointment as scheduled on 01/20/2023 for further revaluation, exam and discussion further, explained that his pain is chronic and stress was assuring so unless he has any NEW/worsening symptoms requiring urgent evaluation/going to the ED, to continue follow up as planned for further evaluation and management  All his questions answered appropriately and he denies any further questions at the moment and noted that the pain frequency and intensity, mild intermittent each time last only few seconds have not changed than before and he confirms intent to follow up as scheduled        DO Delilah   [address]

## 2023-01-19 NOTE — TELEPHONE ENCOUNTER
I  Called and spoke with Alex Amato 01/18 around 5 pm; he reported that he was concerned about his chest pain, intermittent, unchanged from before  Patient was seen last visit on 11/09/2022, chronic intermittent chest discomfort was addressed (chest pain was documented as early as 2016), and was sent in for stress test and echo, with plan to follow up in 5-6 weeks for further eval and management, has appointment scheduled on 01/20/2023     Explained to the patient that the Stress test was Normal and advised to keep his appointment as scheduled on 01/20/2023 for further revaluation, exam and discussion further, explained that his pain is chronic and stress was assuring so unless he has any NEW/worsening symptoms requiring urgent evaluation/going to the ED, to continue follow up as planned for further evaluation and management  All his questions answered appropriately and he denies any further questions at the moment and noted that the pain frequency and intensity, mild intermittent each time last only few seconds have not changed than before and he confirms intent to follow up as scheduled       Perez Pleitez DO   [address]

## 2023-01-20 ENCOUNTER — OFFICE VISIT (OUTPATIENT)
Dept: INTERNAL MEDICINE CLINIC | Facility: CLINIC | Age: 43
End: 2023-01-20

## 2023-01-20 VITALS
TEMPERATURE: 98.5 F | SYSTOLIC BLOOD PRESSURE: 129 MMHG | HEART RATE: 86 BPM | DIASTOLIC BLOOD PRESSURE: 85 MMHG | HEIGHT: 76 IN | BODY MASS INDEX: 35.31 KG/M2 | WEIGHT: 290 LBS

## 2023-01-20 DIAGNOSIS — I10 ESSENTIAL HYPERTENSION: ICD-10-CM

## 2023-01-20 DIAGNOSIS — N18.2 CKD (CHRONIC KIDNEY DISEASE) STAGE 2, GFR 60-89 ML/MIN: ICD-10-CM

## 2023-01-20 DIAGNOSIS — R07.9 EXERTIONAL CHEST PAIN: ICD-10-CM

## 2023-01-20 DIAGNOSIS — S89.91XA RIGHT KNEE INJURY, INITIAL ENCOUNTER: Primary | ICD-10-CM

## 2023-01-20 DIAGNOSIS — Z86.39 HISTORY OF HYPERLIPIDEMIA: ICD-10-CM

## 2023-01-20 PROBLEM — L29.0 ANAL ITCHING: Status: RESOLVED | Noted: 2022-11-09 | Resolved: 2023-01-20

## 2023-01-20 RX ORDER — SENNOSIDES 8.6 MG
650 CAPSULE ORAL EVERY 8 HOURS PRN
Qty: 30 TABLET | Refills: 0 | Status: SHIPPED | OUTPATIENT
Start: 2023-01-20

## 2023-01-20 NOTE — ASSESSMENT & PLAN NOTE
Lab Results   Component Value Date    EGFR 60 04/30/2021    EGFR 75 04/11/2021    EGFR 63 01/27/2019    CREATININE 1 61 (H) 04/30/2021    CREATININE 1 33 (H) 04/11/2021    CREATININE 1 56 (H) 01/27/2019     -Nephrology referral  -[facility] due to recent Advil use

## 2023-01-20 NOTE — ASSESSMENT & PLAN NOTE
-Ischemic workup including stress test negative  -Echo negative  -Patient did endorse some pain after eating, can consider eating smaller meals and tracking symptoms based off meals for possible GERD

## 2023-01-20 NOTE — ASSESSMENT & PLAN NOTE
Lab Results   Component Value Date    CHOLESTEROL 189 11/09/2022    TRIG 95 11/09/2022    HDL 43 11/09/2022    LDLCALC 127 (H) 11/09/2022     -A1c  -Recommend starting statin due to ASCVD score, but patient declined due to not wanting to take more medication-will revisit in 4 weeks at next appointment for annual physical

## 2023-01-20 NOTE — ASSESSMENT & PLAN NOTE
Patient fell at the end of Dec at work and states his right knee has been hurting since   Patient states the pain is mainly when he moves around and not at rest  XR were negative  -Tylenol 650 TID for 1 week  -Voltaran gel  -If no improvement, consider MRI

## 2023-01-20 NOTE — PROGRESS NOTES
[facility]  INTERNAL MEDICINE OFFICE VISIT     PATIENT INFORMATION     Miryam Castro   38 y o  male   MRN: [id number]    ASSESSMENT/PLAN     Problem List Items Addressed This Visit        Cardiovascular and Mediastinum    Essential hypertension     BP controlled today 129/85  Continue amlodipine            Genitourinary    CKD (chronic kidney disease) stage 2, GFR 60-89 ml/min     Lab Results   Component Value Date    EGFR 60 04/30/2021    EGFR 75 04/11/2021    EGFR 63 01/27/2019    CREATININE 1 61 (H) 04/30/2021    CREATININE 1 33 (H) 04/11/2021    CREATININE 1 56 (H) 01/27/2019     -Nephrology referral  -BMP due to recent Advil use           Relevant Orders    Ambulatory Referral to Nephrology    Hemoglobin N4B    Basic metabolic panel       Other    History of hyperlipidemia     Lab Results   Component Value Date    CHOLESTEROL 189 11/09/2022    TRIG 95 11/09/2022    HDL 43 11/09/2022    LDLCALC 127 (H) 11/09/2022     -A1c  -Recommend starting statin due to ASCVD score, but patient declined due to not wanting to take more medication-will revisit in 4 weeks at next appointment for annual physical          Exertional chest pain     -Ischemic workup including stress test negative  -Echo negative  -Patient did endorse some pain after eating, can consider eating smaller meals and tracking symptoms based off meals for possible GERD            Right knee injury, initial encounter - Primary     Patient fell at the end of Dec at work and states his right knee has been hurting since  Patient states the pain is mainly when he moves around and not at rest  XR were negative  -Tylenol 650 TID for 1 week  -Voltaran gel  -If no improvement, consider MRI          Relevant Medications    Diclofenac Sodium (VOLTAREN) 1 %    acetaminophen (TYLENOL) 650 mg CR tablet     Schedule a follow-up appointment in 1 month      HEALTH MAINTENANCE     Immunization History   Administered Date(s) Administered   • COVID-19 PFIZER VACCINE 0 3 ML IM 06/19/2021, 07/10/2021   • Tdap 05/23/2022     CHIEF COMPLAINT     Chief Complaint   Patient presents with   • Follow-up     Chest pain on and off since he had COVID about a year ago      HISTORY OF PRESENT ILLNESS     Patient is a 37 y/o with PMH of SAHIL, CKD2, HTN who presents for follow up of exertional chest pain that started in November after getting COVID  Patient states he still has pain at random times  Patient did have a negative ischemic workup and an Echo done which was negative  Patient states the chest pain has not gotten better nor worse and is the same since November  Patient did endorse some pain after eating, but denied indigestion symptoms  Patient was reassured that cardiac workup was negative  Patient additionally stated that he fell at the end of December at work  Patient states he injured his right knee and has had pain walking on this knee ever since  Patient tried Advil (2/day for roughly a week and a half) which did not help  Options were discussed and patient agreed with tylenol/voltaran gel plan with possible MRI if no improvement  Patient has no other complaints  REVIEW OF SYSTEMS     Review of Systems   Constitutional: Negative for activity change, diaphoresis, fatigue and unexpected weight change  Respiratory: Negative for chest tightness, shortness of breath and wheezing  Cardiovascular: Positive for chest pain  Negative for palpitations and leg swelling  Gastrointestinal: Negative for abdominal distention, abdominal pain, constipation and diarrhea  Genitourinary: Negative for difficulty urinating and dysuria  Musculoskeletal: Positive for arthralgias (R knee)  Negative for back pain  Neurological: Negative for dizziness, light-headedness and headaches  Psychiatric/Behavioral: Negative for agitation       OBJECTIVE     Vitals:    01/20/23 0828   BP: 129/85   BP Location: Left arm   Patient Position: Sitting   Cuff Size: Large Pulse: 86   Temp: 98 5 °F (36 9 °C)   TempSrc: Temporal   Weight: 132 kg (290 lb)   Height: 6' 3 5" (1 918 m)     Physical Exam  Vitals and nursing note reviewed  Constitutional:       General: He is not in acute distress  Appearance: He is well-developed  HENT:      Head: Normocephalic and atraumatic  Eyes:      Conjunctiva/sclera: Conjunctivae normal    Cardiovascular:      Rate and Rhythm: Normal rate and regular rhythm  Heart sounds: No murmur heard  Pulmonary:      Effort: Pulmonary effort is normal  No respiratory distress  Breath sounds: Normal breath sounds  Abdominal:      Palpations: Abdomen is soft  Tenderness: There is no abdominal tenderness  Musculoskeletal:         General: Tenderness (R knee) present  No swelling  Cervical back: Neck supple  Right lower leg: No edema  Left lower leg: No edema  Comments: Drawer test negative R knee, no edema   Skin:     General: Skin is warm and dry  Capillary Refill: Capillary refill takes less than 2 seconds  Neurological:      Mental Status: He is alert and oriented to person, place, and time     Psychiatric:         Mood and Affect: Mood normal        CURRENT MEDICATIONS     Current Outpatient Medications:   •  acetaminophen (TYLENOL) 650 mg CR tablet, Take 1 tablet (650 mg total) by mouth every 8 (eight) hours as needed for mild pain, Disp: 30 tablet, Rfl: 0  •  amLODIPine (NORVASC) 10 mg tablet, TAKE 1 TABLET BY MOUTH EVERY DAY, Disp: 90 tablet, Rfl: 3  •  Diclofenac Sodium (VOLTAREN) 1 %, Apply 2 g topically 4 (four) times a day, Disp: 100 g, Rfl: 0  •  divalproex sodium (DEPAKOTE ER) 250 mg 24 hr tablet, TAKE 3 ORAL TABLETS ONCE A DAY AT BEDTIME, Disp: , Rfl:   •  selenium sulfide (SELSUN) 1 %, Apply topically 2 (two) times a week, Disp: 325 mL, Rfl: 3  •  cholecalciferol (VITAMIN D3) 1,000 units tablet, Take 2 tablets (2,000 Units total) by mouth daily (Patient not taking: No sig reported), Disp: 30 tablet, Rfl: 0  •  QUEtiapine (SEROquel) 50 mg tablet, Take 50 mg by mouth daily at bedtime, Disp: , Rfl:     PAST MEDICAL & SURGICAL HISTORY   History reviewed  No pertinent past medical history  History reviewed  No pertinent surgical history  SOCIAL & FAMILY HISTORY     Social History     Socioeconomic History   • Marital status: /Civil Union     Spouse name: Not on file   • Number of children: Not on file   • Years of education: Not on file   • Highest education level: Not on file   Occupational History   • Not on file   Tobacco Use   • Smoking status: Never   • Smokeless tobacco: Never   Vaping Use   • Vaping Use: Never used   Substance and Sexual Activity   • Alcohol use: Not Currently     Comment: Social   • Drug use: Yes     Types: Marijuana   • Sexual activity: Yes     Partners: Female   Other Topics Concern   • Not on file   Social History Narrative    Caffeine use    Always uses seat belt          Social Determinants of Health     Financial Resource Strain: Low Risk    • Difficulty of Paying Living Expenses: Not hard at all   Food Insecurity: No Food Insecurity   • Worried About Running Out of Food in the Last Year: Never true   • Ran Out of Food in the Last Year: Never true   Transportation Needs: No Transportation Needs   • Lack of Transportation (Medical): No   • Lack of Transportation (Non-Medical):  No   Physical Activity: Not on file   Stress: Not on file   Social Connections: Not on file   Intimate Partner Violence: Not on file   Housing Stability: Not on file     Social History     Substance and Sexual Activity   Alcohol Use Not Currently    Comment: Social       Social History     Substance and Sexual Activity   Drug Use Yes   • Types: Marijuana     Social History     Tobacco Use   Smoking Status Never   Smokeless Tobacco Never     Family History   Problem Relation Age of Onset   • Diabetes Mother    • Hypertension Mother    • Stroke Mother    • Hyperlipidemia Sister ==  Lance Porras MD  PGY-1  [facility]   [facility] [place] , [address] [address] [facility], [address] [facility] [address] [address]  Office: [phone]  Fax: [phone]

## 2023-01-24 ENCOUNTER — TELEPHONE (OUTPATIENT)
Dept: NEPHROLOGY | Facility: CLINIC | Age: 43
End: 2023-01-24

## 2023-01-24 NOTE — TELEPHONE ENCOUNTER
New Patient Intake Form   Patient Details   Becca Montenegro     1980     [id number]     Insurance Information   Name of Patsy malloy   Does the patient need an insurance referral? no   If patient has [employer], please ask if they will be using their [employer]  Appointment Information   Who is calling to schedule? If not patient, what is callers name? Patient    Referring Provider  Dr Inocencia Fletcher   Reason for Appt (Diagnosis) N18 2 (ICD-10-CM) - CKD (chronic kidney disease) stage 2, GFR 60-89 ml/min   Does Patient have labs/urine done at [place] [address]? If not, where do they go? List the date of last lab / urine Yes    Has patient been hospitalized recently? If yes, list name and location of hospital they were in no   Has patient been seen by a Nephrologist before? If yes, list name, location and phone number no   Has the patient had renal imaging done? If so, list the most recent date and type of imaging no   Does patient have a history of Kidney Stones? -   Appointment Details   Is there a referral on file?  yes   Appointment Date 02/23/23   Location [facility]    Miscellaneous

## 2023-02-23 ENCOUNTER — CONSULT (OUTPATIENT)
Dept: NEPHROLOGY | Facility: CLINIC | Age: 43
End: 2023-02-23

## 2023-02-23 VITALS
BODY MASS INDEX: 35.75 KG/M2 | SYSTOLIC BLOOD PRESSURE: 138 MMHG | DIASTOLIC BLOOD PRESSURE: 93 MMHG | WEIGHT: 293.6 LBS | HEART RATE: 75 BPM | HEIGHT: 76 IN

## 2023-02-23 DIAGNOSIS — R79.89 ELEVATED SERUM CREATININE: ICD-10-CM

## 2023-02-23 DIAGNOSIS — E78.00 PURE HYPERCHOLESTEROLEMIA: ICD-10-CM

## 2023-02-23 DIAGNOSIS — I10 ESSENTIAL HYPERTENSION: Primary | ICD-10-CM

## 2023-02-23 DIAGNOSIS — E87.6 HYPOKALEMIA: ICD-10-CM

## 2023-02-23 LAB
SL AMB  POCT GLUCOSE, UA: NORMAL
SL AMB LEUKOCYTE ESTERASE,UA: NORMAL
SL AMB POCT BILIRUBIN,UA: NORMAL
SL AMB POCT BLOOD,UA: NORMAL
SL AMB POCT CLARITY,UA: CLEAR
SL AMB POCT COLOR,UA: YELLOW
SL AMB POCT KETONES,UA: NORMAL
SL AMB POCT NITRITE,UA: NORMAL
SL AMB POCT PH,UA: 6
SL AMB POCT SPECIFIC GRAVITY,UA: 1.01
SL AMB POCT URINE PROTEIN: 15
SL AMB POCT UROBILINOGEN: 0.2

## 2023-02-23 NOTE — PROGRESS NOTES
NEPHROLOGY OUTPATIENT CONSULTATION   Ottoniel Marks 37 y o  male MRN: [id number]  Date: 02/23/23  Reason for consultation: Elevated creatinine    ASSESSMENT and PLAN:  40-year-old male was seen in nephrology office today for evaluation of elevated creatinine      # Elevated creatinine  - Suspect increased creatinine generation from high muscle mass  - Risk factors for development of chronic kidney disease in future: Hypertension, obesity related hyperfiltration  - Baseline Cr: 1 3-1 6 since 2019  - Urinalysis: 1+ protein on dipstick analysis in 2019, dipstick in office today positive for protein, negative for blood  - Proteinuria: Check UACR/UPCR to quantify proteinuria  - Imaging: Check renal ultrasound to look at renal parenchyma and to rule out obstructive etiologies  - Check 24-hour creatinine clearance and Cystatin C to rule out increased muscle mass as cause of increased creatinine  - Discussed risk factor reduction to slow progression of chronic kidney disease  • Intensive blood pressure control  • Lipid control  • Lifestyle modifications (weight loss/exercise)  • Avoidance of NSAIDs due to their short-term and long-term effects on kidney function    # Hypertension/Volume   - Goal BP <120/80 per KDIGO guidelines   - Volume status: Euvolemic  - Status: Blood pressure elevated in the office but has not taken medication for few days  - Current antihypertensive regimen: Amlodipine 10 mg daily  - Changes: No changes to antihypertensive regimen for now  - Based on results of BMP and trend of blood pressure, will start ACE inhibitor or ARB for further blood pressure control  - Discussed low-salt diet  - Discussed weight loss    # Screening for Anemia   - Target Hb: More than 10 g/dL  - Most recent hemoglobin: 14 g/dL    # Electrolytes/Acid Base status  >> Hypokalemia  - Unclear etiology  - Mild hypokalemia on labs from 2021  - Repeat BMP before further evaluation    # Hyperlipidemia  -   - Patient is currently doing a trial of dietary modification and exercise  - Discussed importance of weight loss and management of hypertension, hyperlipidemia and kidney disease    HISTORY OF PRESENT ILLNESS:  Requesting Physician: Lisa Rain DO    Martinez Bustos is a 37 y o  male who was seen in nephrology office today for evaluation of elevated creatinine  He has history of hypertension for last couple of years  He is currently taking amlodipine 10 mg daily  He plays soccer and cricket  He also works out  He takes NSAIDs occasionally  He does not have any family history of kidney disease  He currently feels well  He denies dyspnea  He denies leg swelling  He denies any urinary symptoms  >> Major risk factors for CKD  - Diabetes: No   - Hypertension: Yes for 1 year   - Age ?  52 years: No   - Family history of kidney disease: No   - Obesity or metabolic syndrome: Yes     >> Medical history evaluation   - Prior kidney disease or dialysis: No  - Incidental hematuria in the past: No   - Urinary symptoms: No   - History of foamy or frothy urine: No   - History of nephrolithiasis: No  - Diseases that share risk factors with CKD: HTN  - Systemic diseases that might affect kidney: No   - History of use of medications that might affect renal function: Occasional ibuprofen use     PAST MEDICAL HISTORY:  HTN     PAST SURGICAL HISTORY:  No     ALLERGIES:  No Known Allergies    SOCIAL HISTORY:  Social History     Substance and Sexual Activity   Alcohol Use Not Currently    Comment: Social     Social History     Substance and Sexual Activity   Drug Use Yes   • Types: Marijuana     Social History     Tobacco Use   Smoking Status Never   Smokeless Tobacco Never       FAMILY HISTORY:  Family History   Problem Relation Age of Onset   • Diabetes Mother    • Hypertension Mother    • Stroke Mother    • Hyperlipidemia Sister        MEDICATIONS:    Current Outpatient Medications:   •  acetaminophen (TYLENOL) 650 mg CR tablet, Take 1 tablet (650 mg total) by mouth every 8 (eight) hours as needed for mild pain, Disp: 30 tablet, Rfl: 0  •  amLODIPine (NORVASC) 10 mg tablet, TAKE 1 TABLET BY MOUTH EVERY DAY, Disp: 90 tablet, Rfl: 3  •  divalproex sodium (DEPAKOTE ER) 250 mg 24 hr tablet, TAKE 3 ORAL TABLETS ONCE A DAY AT BEDTIME, Disp: , Rfl:   •  QUEtiapine (SEROquel) 50 mg tablet, Take 50 mg by mouth daily at bedtime, Disp: , Rfl:   •  cholecalciferol (VITAMIN D3) 1,000 units tablet, Take 2 tablets (2,000 Units total) by mouth daily (Patient not taking: Reported on 6/29/2021), Disp: 30 tablet, Rfl: 0  •  Diclofenac Sodium (VOLTAREN) 1 %, Apply 2 g topically 4 (four) times a day (Patient not taking: Reported on 2/23/2023), Disp: 100 g, Rfl: 0  •  selenium sulfide (SELSUN) 1 %, Apply topically 2 (two) times a week (Patient not taking: Reported on 2/23/2023), Disp: 325 mL, Rfl: 3    REVIEW OF SYSTEMS:  Review of Systems   Constitutional: Negative for chills and fever  HENT: Negative for ear pain and sore throat  Eyes: Negative for pain and visual disturbance  Respiratory: Negative for cough and shortness of breath  Cardiovascular: Negative for chest pain and palpitations  Gastrointestinal: Negative for abdominal pain and vomiting  Genitourinary: Negative for dysuria and hematuria  Musculoskeletal: Negative for arthralgias and back pain  Skin: Negative for color change and rash  Neurological: Negative for seizures and syncope  All other systems reviewed and are negative  All the systems were reviewed and were negative except as documented on the HPI  PHYSICAL EXAMINATION:  /93   Pulse 75   Ht 6' 3 5" (1 918 m)   Wt 133 kg (293 lb 9 6 oz)   BMI 36 21 kg/m²   Current Weight: Weight - Scale: 133 kg (293 lb 9 6 oz) Body mass index is 36 21 kg/m²  Physical Exam  Constitutional:       Appearance: Normal appearance  HENT:      Head: Normocephalic and atraumatic        Mouth/Throat:      Mouth: Mucous membranes are moist  Pharynx: Oropharynx is clear  Cardiovascular:      Rate and Rhythm: Normal rate and regular rhythm  Pulses: Normal pulses  Heart sounds: Normal heart sounds  Pulmonary:      Effort: Pulmonary effort is normal       Breath sounds: Normal breath sounds  Abdominal:      General: Bowel sounds are normal       Palpations: Abdomen is soft  Musculoskeletal:         General: Normal range of motion  Right lower leg: No edema  Left lower leg: No edema  Skin:     General: Skin is warm and dry  Neurological:      General: No focal deficit present  Mental Status: He is alert and oriented to person, place, and time  Mental status is at baseline  Psychiatric:         Mood and Affect: Mood normal          Behavior: Behavior normal          Thought Content:  Thought content normal          Judgment: Judgment normal          LABORATORY RESULTS:  Lab Results   Component Value Date    K 3 4 (L) 04/30/2021     (H) 04/30/2021    CO2 27 04/30/2021    BUN 21 04/30/2021    CREATININE 1 61 (H) 04/30/2021    CALCIUM 8 7 04/30/2021    AST 32 04/11/2021    ALT 51 04/11/2021    ALKPHOS 71 04/11/2021    EGFR 60 04/30/2021     Lab Results   Component Value Date    WBC 3 90 (L) 11/09/2022    HGB 14 4 11/09/2022    HCT 43 6 11/09/2022    MCV 77 (L) 11/09/2022     11/09/2022     Lab Results   Component Value Date    CALCIUM 8 7 04/30/2021

## 2023-02-23 NOTE — PATIENT INSTRUCTIONS
2 Gram Low Sodium Diet     A 2 gram sodium diet restricts the amount of sodium in the diet to no more than 2 g or 2000 mg daily  Limiting the amount of sodium is often used to help lower blood pressure  It is important if you have heart, liver, or kidney problems  Many foods contain sodium for flavor and sometimes as a preservative  When the amount of sodium in a diet needs to be low, it is important to know what to look for when choosing foods and drinks  The following includes some information and guidelines to help make it easier for you to adapt to a low sodium diet  QUICK TIPS     Do not add salt to food  Avoid convenience items and fast food  Choose unsalted snack foods  Buy lower sodium products, often labeled as "lower sodium" or "no salt added "   Check food labels to learn how much sodium is in 1 serving  When eating at a restaurant, ask that your food be prepared with less salt or none, if possible  READING FOOD LABELS FOR SODIUM INFORMATION     The nutrition facts label is a good place to find how much sodium is in foods  Look for products with no more than 500 to 600 mg of sodium per meal and no more than 150 mg per serving  Remember that 2 g = 2000 mg  The food label may also list foods as:   Sodium-free: Less than 5 mg in a serving  Very low sodium: 35 mg or less in a serving  Low-sodium: 140 mg or less in a serving  Light in sodium: 50% less sodium in a serving  For example, if a food that usually has 300 mg of sodium is changed to become light in sodium, it will have 150 mg of sodium  Reduced sodium: 25% less sodium in a serving  For example, if a food that usually has 400 mg of sodium is changed to reduced sodium, it will have 300 mg of sodium  CHOOSING FOODS     Grains     Avoid: Salted crackers and snack items  Some cereals, including instant hot cereals  Bread stuffing and biscuit mixes  Seasoned rice or pasta mixes  Choose: Unsalted snack items   Low-sodium cereals, oats, puffed wheat and rice, shredded wheat  English muffins and bread  Pasta  Meats     Avoid: Salted, canned, smoked, spiced, pickled meats, including fish and poultry  Burgos, ham, sausage, cold cuts, hot dogs, anchovies  Choose: Low-sodium canned tuna and salmon  Fresh or frozen meat, poultry, and fish  Dairy   Avoid: Processed cheese and spreads  Cottage cheese  Buttermilk and condensed milk  Regular cheese  Choose: Milk  Low-sodium cottage cheese  Yogurt  Sour cream  Low-sodium cheese  Fruits and Vegetables     Avoid: Regular canned vegetables  Regular canned tomato sauce and paste  Frozen vegetables in sauces  Gaynelle Killer  Mary Picking  Relishes  Sauerkraut  Choose: Low-sodium canned vegetables  Low-sodium tomato sauce and paste  Frozen or fresh vegetables  Fresh and frozen fruit  Condiments     Avoid: Canned and packaged gravies  [facility] sauce  Tartar sauce  Barbecue sauce  Soy sauce  Steak sauce  Ketchup  Onion, garlic, and table salt  Meat flavorings and tenderizers  Choose: Fresh and dried herbs and spices  Low-sodium varieties of mustard and ketchup  Lemon juice  Tabasco sauce  Horseradish      SAMPLE: 2 GRAM SODIUM MEAL PLAN     Breakfast / Sodium (mg)   1 cup low-fat milk / 388 mg   2 slices whole-wheat toast / 270 mg   1 tbs heart-healthy margarine / 153 mg   1 hard-boiled egg / 139 mg   1 small orange / 0 mg    Lunch / Sodium (mg)   1 cup raw carrots / 76 mg   ½ cup hummus / 298 mg   1 cup low-fat milk / 143 mg   ½ cup red grapes / 2 mg   1 whole-wheat alphonso bread / 356 mg    Dinner / Sodium (mg)   1 cup whole-wheat pasta / 2 mg   1 cup low-sodium tomato sauce / 73 mg   3 oz lean ground beef / 57 mg   1 small side salad (1 cup raw spinach leaves, ½ cup cucumber, ¼ cup yellow bell pepper) with 1 tsp olive oil and 1 tsp red wine vinegar / 25 mg    Snack / Sodium (mg)   1 container low-fat vanilla yogurt / 107 mg   3 juanita cracker squares / 127 mg    Nutrient Analysis   Calories: 2033 Protein: 77 g   Carbohydrate: 282 g   Fat: 72 g   Sodium: 1971 mg

## 2023-07-31 ENCOUNTER — TELEPHONE (OUTPATIENT)
Dept: NEPHROLOGY | Facility: CLINIC | Age: 43
End: 2023-07-31

## 2023-07-31 NOTE — TELEPHONE ENCOUNTER
Left voicemail for the patient reminding them to complete labwork prior to 8/7 appointment with Dr. Anthony Christiansen. Advised patient to call back with any questions, concerns, or if they need the orders sent to an outside lab.

## 2023-08-07 ENCOUNTER — TELEPHONE (OUTPATIENT)
Dept: NEPHROLOGY | Facility: CLINIC | Age: 43
End: 2023-08-07

## 2023-08-07 NOTE — TELEPHONE ENCOUNTER
Patient called to cancel his appointment for today with Dr. Harjit Tyler and he will call back to r/s.  No show letter sent

## 2023-12-08 DIAGNOSIS — I10 PRIMARY HYPERTENSION: ICD-10-CM

## 2023-12-08 DIAGNOSIS — S89.91XA RIGHT KNEE INJURY, INITIAL ENCOUNTER: ICD-10-CM

## 2023-12-08 DIAGNOSIS — B35.0 TINEA CAPITIS: ICD-10-CM

## 2023-12-09 RX ORDER — AMLODIPINE BESYLATE 10 MG/1
TABLET ORAL
Qty: 90 TABLET | Refills: 3 | Status: SHIPPED | OUTPATIENT
Start: 2023-12-09

## 2023-12-12 NOTE — TELEPHONE ENCOUNTER
Called patient - did not want to schedule at this time due to lack of insurance and is thinking of moving to [facility].    Patient stated he will call back to schedule

## 2024-02-21 PROBLEM — Z00.00 ROUTINE HEALTH MAINTENANCE: Status: RESOLVED | Noted: 2018-11-28 | Resolved: 2024-02-21

## 2024-09-26 ENCOUNTER — TELEPHONE (OUTPATIENT)
Dept: INTERNAL MEDICINE CLINIC | Facility: CLINIC | Age: 44
End: 2024-09-26

## 2024-11-20 ENCOUNTER — TELEPHONE (OUTPATIENT)
Dept: INTERNAL MEDICINE CLINIC | Facility: CLINIC | Age: 44
End: 2024-11-20

## 2025-02-06 ENCOUNTER — TELEPHONE (OUTPATIENT)
Dept: INTERNAL MEDICINE CLINIC | Facility: CLINIC | Age: 45
End: 2025-02-06

## 2025-07-08 ENCOUNTER — OFFICE VISIT (OUTPATIENT)
Dept: INTERNAL MEDICINE CLINIC | Facility: CLINIC | Age: 45
End: 2025-07-08

## 2025-07-08 VITALS
DIASTOLIC BLOOD PRESSURE: 87 MMHG | TEMPERATURE: 98.7 F | BODY MASS INDEX: 35.07 KG/M2 | HEIGHT: 76 IN | SYSTOLIC BLOOD PRESSURE: 131 MMHG | WEIGHT: 288 LBS | HEART RATE: 92 BPM

## 2025-07-08 DIAGNOSIS — R21 RASH: Primary | ICD-10-CM

## 2025-07-08 PROCEDURE — 99213 OFFICE O/P EST LOW 20 MIN: CPT | Performed by: INTERNAL MEDICINE

## 2025-07-08 RX ORDER — METHYLPREDNISOLONE 4 MG/1
TABLET ORAL
Qty: 21 EACH | Refills: 0 | Status: SHIPPED | OUTPATIENT
Start: 2025-07-08

## 2025-07-08 RX ORDER — DIPHENHYDRAMINE HCL 50 MG
50 CAPSULE ORAL EVERY 6 HOURS PRN
COMMUNITY

## 2025-07-08 RX ORDER — HYDROCORTISONE 25 MG/G
CREAM TOPICAL 2 TIMES DAILY
Qty: 20 G | Refills: 1 | Status: SHIPPED | OUTPATIENT
Start: 2025-07-08

## 2025-07-08 NOTE — PROGRESS NOTES
Name: Vasu Green      : 1980      MRN: [id number]  Encounter Provider: Suzie Sol MD  Encounter Date: 2025   Encounter department: [facility]    Assessment & Plan  Rash  Presenting with ~one week hx of rash after doing yard-work. On chart review, it appears something similar has happened under a decade ago though he does not recall this. Currently not taking any medication apart from Benadryl (which hasn't helped). Will trial Medrol pack and 2.5% hydrocortisone cream and RTC in two weeks to assess for improvement. Is very much due for annual physical and lab work which will also need to be set up.  Orders:    methylPREDNISolone 4 MG tablet therapy pack; Use as directed on package    hydrocortisone 2.5 % cream; Apply topically 2 (two) times a day         History of Present Illness     Vasu Green is a 45 y.o. male who presents today for same day visit regarding rash. The rash started last Wednesday. Said he was doing yard work (described as cutting bushes) around his shed on Tuesday and the following day he noticed changes on his right forearm that was pruritic. Then he noticed on his elbow and legs. Feels like it is getting worse as it is now been spreading to his abdomen and groin. Has only tried Benadryl (unsure of dose) with no relief. He is no longer taking any medication, says he stopped taking meds since ~late , early . Denies any drug or alcohol use. Works for [employer] on the [employer], has been doing so for years. Hasn't tried creams. Lives with aunt and has girlfriend who do not have rash. No fever, chills, nausea, vomiting, difficulty swallowing, chest pain, SOB.             Review of Systems  Past Medical History[1]  Past Surgical History[2]  Family History[3]  Social History[4]  Medications[5]  No Known Allergies  Immunization History   Administered Date(s) Administered    COVID-19 PFIZER VACCINE 0.3 ML IM 2021, 07/10/2021    Tdap  "05/23/2022     Objective   /87 (BP Location: Right arm, Patient Position: Sitting, Cuff Size: Large)   Pulse 92   Temp 98.7 °F (37.1 °C) (Temporal)   Ht 6' 3.5\" (1.918 m)   Wt 131 kg (288 lb)   BMI 35.52 kg/m²     Physical Exam  Vitals reviewed.   Constitutional:       General: He is not in acute distress.  HENT:      Head: Normocephalic and atraumatic.      Mouth/Throat:      Mouth: Mucous membranes are moist.      Pharynx: Oropharynx is clear.     Eyes:      Extraocular Movements: Extraocular movements intact.      Pupils: Pupils are equal, round, and reactive to light.       Cardiovascular:      Rate and Rhythm: Normal rate and regular rhythm.     Skin:     Findings: Rash (On b/l flexor surface of elow, right elbow, medial aspect of right groin, right shin, abdomen (images below)) present.     Neurological:      General: No focal deficit present.      Mental Status: He is alert. Mental status is at baseline.     Psychiatric:         Mood and Affect: Mood normal.         Behavior: Behavior normal.         Thought Content: Thought content normal.                              [1] No past medical history on file.  [2] No past surgical history on file.  [3]   Family History  Problem Relation Name Age of Onset    Diabetes Mother      Hypertension Mother      Stroke Mother      Hyperlipidemia Sister     [4]   Social History  Tobacco Use    Smoking status: Never    Smokeless tobacco: Never   Vaping Use    Vaping status: Never Used   Substance and Sexual Activity    Alcohol use: Not Currently     Comment: Social    Drug use: Not Currently     Types: Marijuana    Sexual activity: Yes     Partners: Female   [5]   Current Outpatient Medications on File Prior to Visit   Medication Sig    diphenhydrAMINE (BENADRYL) 50 mg capsule Take 50 mg by mouth every 6 (six) hours as needed for itching    [DISCONTINUED] acetaminophen (TYLENOL) 650 mg CR tablet Take 1 tablet (650 mg total) by mouth every 8 (eight) hours as needed " for mild pain    [DISCONTINUED] amLODIPine (NORVASC) 10 mg tablet TAKE 1 TABLET BY MOUTH EVERY DAY    [DISCONTINUED] cholecalciferol (VITAMIN D3) 1,000 units tablet Take 2 tablets (2,000 Units total) by mouth daily (Patient not taking: Reported on 6/29/2021)    [DISCONTINUED] Diclofenac Sodium (VOLTAREN) 1 % Apply 2 g topically 4 (four) times a day    [DISCONTINUED] divalproex sodium (DEPAKOTE ER) 250 mg 24 hr tablet TAKE 3 ORAL TABLETS ONCE A DAY AT BEDTIME    [DISCONTINUED] QUEtiapine (SEROquel) 50 mg tablet Take 50 mg by mouth daily at bedtime    [DISCONTINUED] selenium sulfide (SELSUN) 1 % Apply topically 2 (two) times a week

## 2025-07-26 ENCOUNTER — NURSE TRIAGE (OUTPATIENT)
Dept: OTHER | Facility: OTHER | Age: 45
End: 2025-07-26

## 2025-07-27 NOTE — TELEPHONE ENCOUNTER
"REASON FOR CONVERSATION: Advice Only    SYMPTOMS: itching rash like poison ivy    OTHER HEALTH INFORMATION: patient seen in office on 7/8/25 for the same symptoms and given a cream. Patient is requesting the hydrocortizone 2.5% cream.    PROTOCOL DISPOSITION: Home Care (Information or Advice Only Call)    CARE ADVICE PROVIDED: Advised patient there is still one refill left on the cream and he can coordinate with the pharmacy for . Patient verbalized understanding and was thankful.     PRACTICE FOLLOW-UP: none needed at this time              Reason for Disposition   Caller has medicine question only, adult not sick, AND triager answers question    Answer Assessment - Initial Assessment Questions  1. NAME of MEDICINE: \"What medicine(s) are you calling about?\"        hydrocortisone 2.5 % cream    2. QUESTION: \"What is your question?\" (e.g., double dose of medicine, side effect)        Can I have a refill? I have poison ivy again and this worked.    Protocols used: Medication Question Call-Adult-    "

## 2025-08-19 ENCOUNTER — TELEPHONE (OUTPATIENT)
Dept: INTERNAL MEDICINE CLINIC | Facility: CLINIC | Age: 45
End: 2025-08-19